# Patient Record
Sex: FEMALE | Race: OTHER | URBAN - METROPOLITAN AREA
[De-identification: names, ages, dates, MRNs, and addresses within clinical notes are randomized per-mention and may not be internally consistent; named-entity substitution may affect disease eponyms.]

---

## 2017-02-28 RX ORDER — LEVETIRACETAM 500 MG/1
2 TABLET ORAL
COMMUNITY
Start: 2015-07-06 | End: 2020-05-01

## 2017-02-28 RX ORDER — GUAIFENESIN 100 MG/5ML
10 SYRUP ORAL
COMMUNITY
Start: 2015-07-06 | End: 2018-02-07

## 2017-02-28 RX ORDER — NIACIN 500 MG
1 TABLET ORAL DAILY
COMMUNITY
Start: 2015-07-06

## 2017-02-28 RX ORDER — ASPIRIN 81 MG/1
1 TABLET, CHEWABLE ORAL DAILY
COMMUNITY
Start: 2015-07-06 | End: 2020-05-01

## 2017-02-28 RX ORDER — LISINOPRIL 5 MG/1
1 TABLET ORAL DAILY
COMMUNITY
Start: 2015-07-06 | End: 2020-05-01

## 2017-02-28 RX ORDER — OLANZAPINE 20 MG/1
1 TABLET ORAL DAILY
COMMUNITY
Start: 2015-07-06 | End: 2019-11-26

## 2017-02-28 RX ORDER — ALLOPURINOL 100 MG/1
1 TABLET ORAL DAILY
COMMUNITY
Start: 2015-07-06 | End: 2020-05-01

## 2017-02-28 RX ORDER — LANCETS 33 GAUGE
EACH MISCELLANEOUS
COMMUNITY
Start: 2016-07-19 | End: 2017-07-24

## 2017-02-28 RX ORDER — MINOCYCLINE HYDROCHLORIDE 50 MG/1
1 TABLET ORAL
COMMUNITY
Start: 2015-09-30 | End: 2017-08-14 | Stop reason: ALTCHOICE

## 2017-02-28 RX ORDER — BENZTROPINE MESYLATE 0.5 MG/1
1 TABLET ORAL 2 TIMES DAILY
COMMUNITY
Start: 2015-07-06

## 2017-02-28 RX ORDER — LORAZEPAM 0.5 MG/1
1 TABLET ORAL 2 TIMES DAILY
COMMUNITY
Start: 2015-07-06 | End: 2019-05-29

## 2017-02-28 RX ORDER — GLIMEPIRIDE 4 MG/1
1 TABLET ORAL EVERY MORNING
COMMUNITY
Start: 2015-07-06 | End: 2019-11-04

## 2017-02-28 RX ORDER — LITHIUM CARBONATE 300 MG
1 TABLET ORAL EVERY 12 HOURS
COMMUNITY
Start: 2015-07-06

## 2017-02-28 RX ORDER — FERROUS SULFATE 325(65) MG
1 TABLET ORAL EVERY 12 HOURS
COMMUNITY
Start: 2015-07-06 | End: 2020-05-01

## 2017-02-28 RX ORDER — HYDROCORTISONE 0.5 %
CREAM (GRAM) TOPICAL
COMMUNITY
Start: 2015-07-06 | End: 2018-11-07

## 2017-02-28 RX ORDER — INSULIN LISPRO 100 [IU]/ML
INJECTION, SOLUTION INTRAVENOUS; SUBCUTANEOUS
COMMUNITY
Start: 2015-07-06 | End: 2019-11-04

## 2017-02-28 RX ORDER — MAGNESIUM HYDROXIDE/ALUMINUM HYDROXICE/SIMETHICONE 120; 1200; 1200 MG/30ML; MG/30ML; MG/30ML
30 SUSPENSION ORAL
COMMUNITY
Start: 2015-07-06 | End: 2020-05-01

## 2017-02-28 RX ORDER — LISINOPRIL 5 MG/1
TABLET ORAL
Qty: 31 TABLET | Refills: 10 | Status: SHIPPED | OUTPATIENT
Start: 2017-02-28 | End: 2017-08-14

## 2017-02-28 NOTE — TELEPHONE ENCOUNTER
Marco Antonio Mcarthur at [employer] informed of the below recommendations.   Appt scheduled with Dr. Mian Rodas on Friday, 3/10/2017 @ 1:30pm. Caty Chapin, 02/28/2017, 3:58 PM

## 2017-02-28 NOTE — TELEPHONE ENCOUNTER
Pt due for complete physical March 3, 2017. Will provide 1 month refill. Please instruct patient to make physical appointment. Thank you.

## 2017-03-10 ENCOUNTER — OFFICE VISIT (OUTPATIENT)
Dept: FAMILY MEDICINE CLINIC | Facility: CLINIC | Age: 32
End: 2017-03-10

## 2017-03-10 VITALS
WEIGHT: 193 LBS | DIASTOLIC BLOOD PRESSURE: 72 MMHG | SYSTOLIC BLOOD PRESSURE: 108 MMHG | HEART RATE: 104 BPM | TEMPERATURE: 98 F

## 2017-03-10 DIAGNOSIS — Z00.00 PHYSICAL EXAM: Primary | ICD-10-CM

## 2017-03-10 DIAGNOSIS — E11.9 CONTROLLED TYPE 2 DIABETES MELLITUS WITHOUT COMPLICATION, WITHOUT LONG-TERM CURRENT USE OF INSULIN (HCC): ICD-10-CM

## 2017-03-10 PROCEDURE — 99395 PREV VISIT EST AGE 18-39: CPT | Performed by: FAMILY MEDICINE

## 2017-03-10 NOTE — PATIENT INSTRUCTIONS
Continue current medications   Continue to monitor glucose level, call if glucose level less than 70 or more than 300. Check fasting labs.

## 2017-03-11 NOTE — PROGRESS NOTES
HPI:     Wei Gomez is a 34year old female who presents for an Annual Health Visit. Patient is here with her caregiver from group home. Patient also has history of diabetes which is stable and she is tolerating medications well.   She does not ha sliding scale three times a day  151-200 =2 units, 201-250 = 3 units, 251-300 = 4 units, 301-350=5 units, 351-400=6 units, 401 or greater call RN Disp:  Rfl:    Insulin Syringe-Needle U-100 (BD INSULIN SYRINGE) 30G X 1/2\" 0.5 ML Does not apply Misc  Disp: rashes  EYES: no visual complaints or deficits  HEENT: denies nasal congestion, sinus pain or sore throat; hearing loss negative  RESPIRATORY: denies shortness of breath, wheezing or cough   CARDIOVASCULAR: denies chest pain or HOUSE; no palpitations   GI: d CHRONIC CONDITIONS:     Meds & Refills for this Visit:  No prescriptions requested or ordered in this encounter       Imaging & Consults:  None    Visit Diagnoses:  Physical exam  (primary encounter diagnosis)  Controlled type 2 diabetes mellitus without c

## 2017-04-12 ENCOUNTER — TELEPHONE (OUTPATIENT)
Dept: FAMILY MEDICINE CLINIC | Facility: CLINIC | Age: 32
End: 2017-04-12

## 2017-04-12 NOTE — PROGRESS NOTES
Please inform nurse at a once a day to decrease patient's Lantus to 7 units daily.   Phone #[phone]  Asa To

## 2017-04-12 NOTE — PROGRESS NOTES
Please inform nurse at a once a day to decrease patient's Lantus to 7 units daily.   Phone #[phone]  Tosha Rendon

## 2017-04-12 NOTE — TELEPHONE ENCOUNTER
Left message to contact office. Please inform nurse at a once a day to decrease patient's Lantus to 7 units daily. Per Dr Hermelinda Oneill.

## 2017-07-13 ENCOUNTER — TELEPHONE (OUTPATIENT)
Dept: FAMILY MEDICINE CLINIC | Facility: CLINIC | Age: 32
End: 2017-07-13

## 2017-07-13 NOTE — TELEPHONE ENCOUNTER
Please call nurse at [facility], advised to decrease patient's Lantus to 5 units due to patient having low blood sugar level.

## 2017-07-24 RX ORDER — LANCETS 33 GAUGE
EACH MISCELLANEOUS
Qty: 200 EACH | Refills: 0 | Status: SHIPPED | OUTPATIENT
Start: 2017-07-24 | End: 2017-10-11

## 2017-07-26 ENCOUNTER — TELEPHONE (OUTPATIENT)
Dept: FAMILY MEDICINE CLINIC | Facility: CLINIC | Age: 32
End: 2017-07-26

## 2017-07-26 LAB
HDL CHOLESTEROL: 31 MG/DL
LDL CHOLESTEROL: 62 MG/DL (ref ?–130)
LITHIUM: 0.6
TOTAL CHOLESTEROL: 118 MG/DL (ref ?–200)
TRIGLYCERIDES: 126 MG/DL
TSH: 3.72 UIU/ML

## 2017-08-14 ENCOUNTER — OFFICE VISIT (OUTPATIENT)
Dept: FAMILY MEDICINE CLINIC | Facility: CLINIC | Age: 32
End: 2017-08-14

## 2017-08-14 ENCOUNTER — APPOINTMENT (OUTPATIENT)
Dept: LAB | Age: 32
End: 2017-08-14
Attending: FAMILY MEDICINE
Payer: MEDICAID

## 2017-08-14 VITALS
SYSTOLIC BLOOD PRESSURE: 126 MMHG | WEIGHT: 207.25 LBS | HEART RATE: 88 BPM | TEMPERATURE: 99 F | RESPIRATION RATE: 16 BRPM | DIASTOLIC BLOOD PRESSURE: 72 MMHG

## 2017-08-14 DIAGNOSIS — N61.1 ABSCESS OF LEFT BREAST: ICD-10-CM

## 2017-08-14 DIAGNOSIS — I10 ESSENTIAL HYPERTENSION: ICD-10-CM

## 2017-08-14 DIAGNOSIS — F25.9 CHRONIC SCHIZOAFFECTIVE DISORDER (HCC): ICD-10-CM

## 2017-08-14 DIAGNOSIS — E11.9 CONTROLLED TYPE 2 DIABETES MELLITUS WITHOUT COMPLICATION, WITHOUT LONG-TERM CURRENT USE OF INSULIN (HCC): Primary | ICD-10-CM

## 2017-08-14 LAB
ALBUMIN SERPL-MCNC: 4.1 G/DL (ref 3.5–4.8)
ALP LIVER SERPL-CCNC: 54 U/L (ref 37–98)
ALT SERPL-CCNC: 18 U/L (ref 14–54)
AST SERPL-CCNC: 12 U/L (ref 15–41)
BASOPHILS # BLD AUTO: 0.03 X10(3) UL (ref 0–0.1)
BASOPHILS NFR BLD AUTO: 0.3 %
BILIRUB SERPL-MCNC: 0.1 MG/DL (ref 0.1–2)
BUN BLD-MCNC: 19 MG/DL (ref 8–20)
CALCIUM BLD-MCNC: 10 MG/DL (ref 8.3–10.3)
CHLORIDE: 105 MMOL/L (ref 101–111)
CO2: 24 MMOL/L (ref 22–32)
CREAT BLD-MCNC: 0.75 MG/DL (ref 0.55–1.02)
EOSINOPHIL # BLD AUTO: 0.12 X10(3) UL (ref 0–0.3)
EOSINOPHIL NFR BLD AUTO: 1 %
ERYTHROCYTE [DISTWIDTH] IN BLOOD BY AUTOMATED COUNT: 12.5 % (ref 11.5–16)
EST. AVERAGE GLUCOSE BLD GHB EST-MCNC: 97 MG/DL (ref 68–126)
GLUCOSE BLD-MCNC: 76 MG/DL (ref 70–99)
HBA1C MFR BLD HPLC: 5 % (ref ?–5.7)
HCT VFR BLD AUTO: 36.4 % (ref 34–50)
HGB BLD-MCNC: 11.6 G/DL (ref 12–16)
IMMATURE GRANULOCYTE COUNT: 0.06 X10(3) UL (ref 0–1)
IMMATURE GRANULOCYTE RATIO %: 0.5 %
LYMPHOCYTES # BLD AUTO: 3.94 X10(3) UL (ref 0.9–4)
LYMPHOCYTES NFR BLD AUTO: 33.6 %
M PROTEIN MFR SERPL ELPH: 8.8 G/DL (ref 6.1–8.3)
MCH RBC QN AUTO: 29.9 PG (ref 27–33.2)
MCHC RBC AUTO-ENTMCNC: 31.9 G/DL (ref 31–37)
MCV RBC AUTO: 93.8 FL (ref 81–100)
MONOCYTES # BLD AUTO: 0.46 X10(3) UL (ref 0.1–0.6)
MONOCYTES NFR BLD AUTO: 3.9 %
NEUTROPHIL ABS PRELIM: 7.12 X10 (3) UL (ref 1.3–6.7)
NEUTROPHILS # BLD AUTO: 7.12 X10(3) UL (ref 1.3–6.7)
NEUTROPHILS NFR BLD AUTO: 60.7 %
PLATELET # BLD AUTO: 356 10(3)UL (ref 150–450)
POTASSIUM SERPL-SCNC: 4.4 MMOL/L (ref 3.6–5.1)
RBC # BLD AUTO: 3.88 X10(6)UL (ref 3.8–5.1)
RED CELL DISTRIBUTION WIDTH-SD: 42.9 FL (ref 35.1–46.3)
SODIUM SERPL-SCNC: 137 MMOL/L (ref 136–144)
WBC # BLD AUTO: 11.7 X10(3) UL (ref 4–13)

## 2017-08-14 PROCEDURE — 99214 OFFICE O/P EST MOD 30 MIN: CPT | Performed by: FAMILY MEDICINE

## 2017-08-14 PROCEDURE — 80053 COMPREHEN METABOLIC PANEL: CPT | Performed by: FAMILY MEDICINE

## 2017-08-14 PROCEDURE — 83036 HEMOGLOBIN GLYCOSYLATED A1C: CPT | Performed by: FAMILY MEDICINE

## 2017-08-14 PROCEDURE — 85025 COMPLETE CBC W/AUTO DIFF WBC: CPT | Performed by: FAMILY MEDICINE

## 2017-08-14 PROCEDURE — 36415 COLL VENOUS BLD VENIPUNCTURE: CPT | Performed by: FAMILY MEDICINE

## 2017-08-14 RX ORDER — CEPHALEXIN 500 MG/1
500 CAPSULE ORAL 3 TIMES DAILY
Qty: 30 CAPSULE | Refills: 0 | Status: SHIPPED | COMMUNITY
Start: 2017-08-14 | End: 2018-02-07

## 2017-08-14 RX ORDER — VALPROIC ACID 250 MG/5ML
5 SOLUTION ORAL EVERY 8 HOURS PRN
Refills: 0 | COMMUNITY
Start: 2017-05-28 | End: 2019-05-29

## 2017-08-14 NOTE — PROGRESS NOTES
[address]  PROGRESS NOTE  Chief Complaint:   Patient presents with:  Diabetes: Follow up for diabetes      HPI:   This is a 34year old female with history of diabetes and hypertension presents with caretaker for follow-up.   Patient has mouth 3 (three) times daily.  Disp: 30 capsule Rfl: 0   ONETOUCH DELICA LANCETS 78E Does not apply Misc TEST BLOOD SUGAR THREE TIMES DAILY AND AS NEEDED UP TO 5 TIMES PER DAY Disp: 200 each Rfl: 0   ONETOUCH ULTRA BLUE In Vitro Strip TEST BLOOD SUGAR THREE magnesium hydroxide (EQ MILK OF MAGNESIA) 400 MG/5ML Oral Suspension 2 teaspoons as needed every 12 hours Disp:  Rfl:    menthol-methyl salicylate 71-94 % External Cream Apply to left leg every 6 hours as needed for pain Disp:  Rfl:    metRONIDAZOLE 0.75 anicteric, conjunctiva normal, PERRLA, EOMI. LUNGS: Clear to auscultation bilterally, no rales/rhonchi/wheezing. HEART:  Regular rate and rhythm, S1 and S2 are normal, no murmurs, rubs or gallops.   EXTREMITIES: No edema, no cyanosis, no clubbing, FROM, 2 02/24/1984  Diabetes Care Foot Exam due on 02/24/1984  Diabetes Care A1C due on 02/24/1984  Diabetes Care Dilated Eye Exam due on 02/24/1984  Pap Smear,3 Years due on 02/24/2015    Patient/Caregiver Education: Patient/Caregiver Education: There are no barr

## 2017-08-14 NOTE — PATIENT INSTRUCTIONS
Continue current medications   Start Keflex for abscess. See Dr Krystal Pal, surgeon for possible drainage. Continue to monitor glucose level, call if glucose level less than 70 or more than 300. Check labs today.

## 2017-08-16 ENCOUNTER — TELEPHONE (OUTPATIENT)
Dept: FAMILY MEDICINE CLINIC | Facility: CLINIC | Age: 32
End: 2017-08-16

## 2017-08-16 NOTE — TELEPHONE ENCOUNTER
Please inform nurse at [address] [place] [address] that, patient's HgA1c is 5.0. Her diabetes is over controlled recommend to decrease her lantus from 7 units to 5 units. Her other labs are ok.

## 2017-10-11 RX ORDER — LANCETS 33 GAUGE
EACH MISCELLANEOUS
Qty: 200 EACH | Refills: 11 | Status: SHIPPED | OUTPATIENT
Start: 2017-10-11

## 2017-10-11 NOTE — TELEPHONE ENCOUNTER
Future appt:  None   Last Appointment:  8/14/2017; Return in about 3 months (around 11/14/2017).      TOTAL CHOLESTEROL (mg/dL)   Date Value   07/11/2017 118   ----------  HDL Cholesterol (mg/dL)   Date Value   07/11/2017 31   ----------  LDL CHOLESTEROL (m

## 2017-10-27 ENCOUNTER — TELEPHONE (OUTPATIENT)
Dept: FAMILY MEDICINE CLINIC | Facility: CLINIC | Age: 32
End: 2017-10-27

## 2017-10-27 NOTE — TELEPHONE ENCOUNTER
Received fax stating that patient has boil under her arm again. States that they seem to resolve when using warm compresses.   Patient lives at [address] [place] [address] and the staff is wondering if there may be a prescription deodorant that might help this conditio

## 2018-02-07 ENCOUNTER — OFFICE VISIT (OUTPATIENT)
Dept: FAMILY MEDICINE CLINIC | Facility: CLINIC | Age: 33
End: 2018-02-07

## 2018-02-07 VITALS
DIASTOLIC BLOOD PRESSURE: 62 MMHG | WEIGHT: 221.38 LBS | RESPIRATION RATE: 18 BRPM | TEMPERATURE: 98 F | HEART RATE: 98 BPM | OXYGEN SATURATION: 97 % | SYSTOLIC BLOOD PRESSURE: 116 MMHG

## 2018-02-07 DIAGNOSIS — J40 BRONCHITIS: Primary | ICD-10-CM

## 2018-02-07 DIAGNOSIS — R19.7 DIARRHEA, UNSPECIFIED TYPE: ICD-10-CM

## 2018-02-07 PROCEDURE — 99214 OFFICE O/P EST MOD 30 MIN: CPT | Performed by: FAMILY MEDICINE

## 2018-02-07 RX ORDER — GUAIFENESIN 100 MG/5ML
10 SYRUP ORAL EVERY 4 HOURS PRN
Qty: 1 BOTTLE | Refills: 5 | Status: SHIPPED | OUTPATIENT
Start: 2018-02-07 | End: 2020-05-01

## 2018-02-07 RX ORDER — AZITHROMYCIN 250 MG/1
TABLET, FILM COATED ORAL
Qty: 6 TABLET | Refills: 0 | Status: SHIPPED | OUTPATIENT
Start: 2018-02-07 | End: 2018-11-07 | Stop reason: ALTCHOICE

## 2018-02-07 NOTE — PROGRESS NOTES
[facility]  [facility]  Chief Complaint:   Patient presents with:  Fever: fever and productive cough since sunday       HPI:   This is a 34year old female presents with caregiver complaining of patient having fever, productive cough Monocyte % 3.9 %   Eosinophil % 1.0 %   Basophil % 0.3 %   Immature Granulocyte % 0.5 %       Past Medical History:   Diagnosis Date   • Acne    • Anemia    • Bipolar affective disorder ([facility]    • Diabetes type 2, controlled ([facility]    • Gout    • Hearing 314-165-62 MG/5ML Oral Suspension Take 30 mL by mouth. Every 12 hours as needed for indigestion Disp:  Rfl:    aspirin 81 MG Oral Chew Tab Chew 1 tablet by mouth daily.  Disp:  Rfl:    Benztropine Mesylate 0.5 MG Oral Tab Take 1 tablet by mouth 2 (two) time exertion or at rest.  RESPIRATORY: See HPI  GASTROINTESTINAL:  Denies abdominal pain, nausea, vomiting, constipation, diarrhea, or blood in stool. MUSCULOSKELETAL:  Denies weakness, muscle aches, back pain, joint pain, swelling or stiffness.   LYMPHATICS: by mouth today, then one daily. Patient Instructions   Recommend rest, plenty of fluids. Also recommend half low carb gatorade with water or pedialyte diluted in water for next 2-3 days. Start antibiotics.    Use robitussin as needed   Go to ER if

## 2018-02-07 NOTE — PATIENT INSTRUCTIONS
Recommend rest, plenty of fluids. Also recommend half low carb gatorade with water or pedialyte diluted in water for next 2-3 days. Start antibiotics. Use robitussin as needed   Go to ER if any sign of dehydration or if patient not feeling well.    Retu

## 2018-02-08 ENCOUNTER — TELEPHONE (OUTPATIENT)
Dept: FAMILY MEDICINE CLINIC | Facility: CLINIC | Age: 33
End: 2018-02-08

## 2018-02-08 RX ORDER — ACETAMINOPHEN 325 MG/1
TABLET ORAL
Qty: 30 TABLET | Refills: 1 | Status: SHIPPED | OUTPATIENT
Start: 2018-02-08 | End: 2020-05-01

## 2018-02-08 NOTE — TELEPHONE ENCOUNTER
The nurse was informed of the following below and agreed. Nurse is needing a script sent over with specific dosage and how often she can take medication.      Needs it faxed to [facility] Della Louis

## 2018-02-08 NOTE — TELEPHONE ENCOUNTER
Yes they may alternate Tylenol and ibuprofen every 4 hours. Dose of Tylenol, then 4 hours later ibuprofen, then 4 hours later Tylenol, etc. (need to have at least 6 hrs between ibuprofen doses).

## 2018-02-08 NOTE — TELEPHONE ENCOUNTER
seen yesterday and diagnosed with bronchitis- is still running fever with ibuprofen - can they give her tylenol in between doses?

## 2018-03-14 ENCOUNTER — TELEPHONE (OUTPATIENT)
Dept: FAMILY MEDICINE CLINIC | Facility: CLINIC | Age: 33
End: 2018-03-14

## 2018-03-14 NOTE — TELEPHONE ENCOUNTER
Need signed referral for OT Adult Sensory Eval dated 11/10/17 with dx of Adult Sensory disorder.    fax # [phone]

## 2018-05-17 ENCOUNTER — OFFICE VISIT (OUTPATIENT)
Dept: FAMILY MEDICINE CLINIC | Facility: CLINIC | Age: 33
End: 2018-05-17

## 2018-05-17 VITALS
TEMPERATURE: 99 F | SYSTOLIC BLOOD PRESSURE: 118 MMHG | WEIGHT: 221 LBS | HEART RATE: 92 BPM | DIASTOLIC BLOOD PRESSURE: 78 MMHG | RESPIRATION RATE: 18 BRPM

## 2018-05-17 DIAGNOSIS — E11.9 CONTROLLED TYPE 2 DIABETES MELLITUS WITHOUT COMPLICATION, WITHOUT LONG-TERM CURRENT USE OF INSULIN (HCC): ICD-10-CM

## 2018-05-17 DIAGNOSIS — Z00.00 PHYSICAL EXAM: Primary | ICD-10-CM

## 2018-05-17 DIAGNOSIS — I10 ESSENTIAL HYPERTENSION: ICD-10-CM

## 2018-05-17 PROCEDURE — 99395 PREV VISIT EST AGE 18-39: CPT | Performed by: FAMILY MEDICINE

## 2018-05-17 NOTE — PROGRESS NOTES
[facility]    HPI:     Mesha Wolf is a 28year old female who presents for an Annual Health Visit. Patient presents with staff from [address] [place] [address].   Patient has history of diabetes, her lungs reviewed, there has been couple occasions fo Suspension Take 30 mL by mouth. Every 12 hours as needed for indigestion Disp:  Rfl:    aspirin 81 MG Oral Chew Tab Chew 1 tablet by mouth daily. Disp:  Rfl:    Benztropine Mesylate 0.5 MG Oral Tab Take 1 tablet by mouth 2 (two) times daily.  Disp:  Rfl: disorder    • Lead poisoning    • Metabolic syndrome    • Moderate mental retardation    • Schizoaffective disorder, chronic condition ([facility]    • Seizure disorder (HCC)       No past surgical history on file. No family history on file.    SOCIAL HISTORY: DIFFERENTIAL WITH PLATELET  -     COMP METABOLIC PANEL (14)  -     HEMOGLOBIN A1C  -     TSH W REFLEX TO FREE T4    Controlled type 2 diabetes mellitus without complication, without long-term current use of insulin (HCC)  -     HEMOGLOBIN A1C    Essential

## 2018-05-17 NOTE — PATIENT INSTRUCTIONS
Recommend to decrease Lantus to 3 units. Continue to monitor glucose level. Check labs. Return to clinic if any concern.

## 2018-07-18 ENCOUNTER — TELEPHONE (OUTPATIENT)
Dept: FAMILY MEDICINE CLINIC | Facility: CLINIC | Age: 33
End: 2018-07-18

## 2018-07-18 NOTE — TELEPHONE ENCOUNTER
Insurance changes coverage on test strips to contour. Dr Mian Rodas can you please send in new rx. Thank you.        Future Appointments  Date Time Provider Renetta Zabrina   11/7/2018 10:00 AM Marily Tuttle MD [facility] [place] [facility] [place]      Return in about

## 2018-08-27 ENCOUNTER — TELEPHONE (OUTPATIENT)
Dept: FAMILY MEDICINE CLINIC | Facility: CLINIC | Age: 33
End: 2018-08-27

## 2018-09-10 ENCOUNTER — MED REC SCAN ONLY (OUTPATIENT)
Dept: FAMILY MEDICINE CLINIC | Facility: CLINIC | Age: 33
End: 2018-09-10

## 2018-09-25 ENCOUNTER — TELEPHONE (OUTPATIENT)
Dept: FAMILY MEDICINE CLINIC | Facility: CLINIC | Age: 33
End: 2018-09-25

## 2018-10-13 ENCOUNTER — MED REC SCAN ONLY (OUTPATIENT)
Dept: FAMILY MEDICINE CLINIC | Facility: CLINIC | Age: 33
End: 2018-10-13

## 2018-11-02 ENCOUNTER — TELEPHONE (OUTPATIENT)
Dept: FAMILY MEDICINE CLINIC | Facility: CLINIC | Age: 33
End: 2018-11-02

## 2018-11-02 DIAGNOSIS — E11.9 CONTROLLED TYPE 2 DIABETES MELLITUS WITHOUT COMPLICATION, WITHOUT LONG-TERM CURRENT USE OF INSULIN (HCC): Primary | ICD-10-CM

## 2018-11-02 NOTE — TELEPHONE ENCOUNTER
Please inform nurse at [address] [place] [address] that patient is due for her labs. Patient may schedule lab appointment before her next appointment next Wednesday at 8. Orders are placed in chart.

## 2018-11-07 ENCOUNTER — OFFICE VISIT (OUTPATIENT)
Dept: FAMILY MEDICINE CLINIC | Facility: CLINIC | Age: 33
End: 2018-11-07
Payer: MEDICAID

## 2018-11-07 VITALS
HEART RATE: 76 BPM | RESPIRATION RATE: 20 BRPM | WEIGHT: 238.38 LBS | TEMPERATURE: 97 F | DIASTOLIC BLOOD PRESSURE: 84 MMHG | SYSTOLIC BLOOD PRESSURE: 118 MMHG

## 2018-11-07 DIAGNOSIS — I10 ESSENTIAL HYPERTENSION: ICD-10-CM

## 2018-11-07 DIAGNOSIS — E11.9 CONTROLLED TYPE 2 DIABETES MELLITUS WITHOUT COMPLICATION, WITHOUT LONG-TERM CURRENT USE OF INSULIN (HCC): Primary | ICD-10-CM

## 2018-11-07 DIAGNOSIS — D64.9 ANEMIA, UNSPECIFIED TYPE: ICD-10-CM

## 2018-11-07 PROCEDURE — 99214 OFFICE O/P EST MOD 30 MIN: CPT | Performed by: FAMILY MEDICINE

## 2018-11-07 PROCEDURE — 84550 ASSAY OF BLOOD/URIC ACID: CPT | Performed by: FAMILY MEDICINE

## 2018-11-07 PROCEDURE — 36415 COLL VENOUS BLD VENIPUNCTURE: CPT | Performed by: FAMILY MEDICINE

## 2018-11-07 PROCEDURE — 80050 GENERAL HEALTH PANEL: CPT | Performed by: FAMILY MEDICINE

## 2018-11-07 PROCEDURE — 83036 HEMOGLOBIN GLYCOSYLATED A1C: CPT | Performed by: FAMILY MEDICINE

## 2018-11-07 RX ORDER — OLANZAPINE 5 MG/1
5 TABLET ORAL NIGHTLY
COMMUNITY

## 2018-11-07 RX ORDER — OLANZAPINE 15 MG/1
15 TABLET ORAL DAILY
COMMUNITY
End: 2021-01-19 | Stop reason: DRUGHIGH

## 2018-11-07 NOTE — PATIENT INSTRUCTIONS
Continue current medications and current care. Check labs today. Recommend not to give diabetic medication when patient is fasting for labs in morning. She can have it after she eats breakfast after lab draw.

## 2018-11-07 NOTE — PROGRESS NOTES
[address]  PROGRESS NOTE  Chief Complaint:   Patient presents with: Follow - Up      HPI:   This is a 28year old female presents with staff from [address] [place] [address] for follow-up on diabetes, hypertension.   Patient's blood glucose at home has bee not apply Misc TEST BLOOD SUGAR THREE TIMES DAILY AND AS NEEDED UP TO 5 TIMES PER DAY Disp: 200 each Rfl: 11   Valproate Sodium 250 MG/5ML Oral Solution Take 5 mL by mouth every 8 (eight) hours as needed.  Disp:  Rfl: 0   Insulin Syringe-Needle U-100 (BD IN given: Not Answered         REVIEW OF SYSTEMS:   See HPI, per staff    EXAM:   /84 (BP Location: Right arm, Patient Position: Sitting, Cuff Size: large)   Pulse 76   Temp 97.1 °F (36.2 °C) (Tympanic)   Resp 20   Wt 238 lb 6 oz  There is no height or on 07/11/2018    Patient/Caregiver Education: Patient/Caregiver Education: There are no barriers to learning. Medical education done. Outcome: Patient verbalizes understanding.  Patient is notified to call with any questions, complications, allergies, or

## 2018-11-08 ENCOUNTER — TELEPHONE (OUTPATIENT)
Dept: FAMILY MEDICINE CLINIC | Facility: CLINIC | Age: 33
End: 2018-11-08

## 2018-11-08 NOTE — TELEPHONE ENCOUNTER
----- Message from Fozia Jordan MD sent at 11/8/2018  8:19 AM CST -----  Please inform nurse that patient's hemoglobin A1c is 5.3, her diabetes is stable, recommend to continue with current medication. Her hemoglobin is also slightly low at 11.3.   Rest o

## 2018-12-21 ENCOUNTER — TELEPHONE (OUTPATIENT)
Dept: FAMILY MEDICINE CLINIC | Facility: CLINIC | Age: 33
End: 2018-12-21

## 2018-12-21 NOTE — TELEPHONE ENCOUNTER
Linda Perkins is the patient's care coordinator. She was just calling to ask if there were any questions for her? Informed Kimi that I did not. No other questions at this time.

## 2019-03-12 ENCOUNTER — TELEPHONE (OUTPATIENT)
Dept: FAMILY MEDICINE CLINIC | Facility: CLINIC | Age: 34
End: 2019-03-12

## 2019-03-12 NOTE — TELEPHONE ENCOUNTER
Magnus Calderón is patients care coordinator, wants to let Dr know that he can give her a call if there's any concerns in regards to patient.

## 2019-05-29 ENCOUNTER — OFFICE VISIT (OUTPATIENT)
Dept: FAMILY MEDICINE CLINIC | Facility: CLINIC | Age: 34
End: 2019-05-29
Payer: MEDICAID

## 2019-05-29 VITALS
SYSTOLIC BLOOD PRESSURE: 110 MMHG | HEIGHT: 63 IN | RESPIRATION RATE: 22 BRPM | TEMPERATURE: 98 F | DIASTOLIC BLOOD PRESSURE: 70 MMHG | OXYGEN SATURATION: 99 % | HEART RATE: 110 BPM | BODY MASS INDEX: 42.35 KG/M2 | WEIGHT: 239 LBS

## 2019-05-29 DIAGNOSIS — I10 ESSENTIAL HYPERTENSION: ICD-10-CM

## 2019-05-29 DIAGNOSIS — Z00.00 PHYSICAL EXAM: Primary | ICD-10-CM

## 2019-05-29 DIAGNOSIS — D64.9 ANEMIA, UNSPECIFIED TYPE: ICD-10-CM

## 2019-05-29 DIAGNOSIS — E11.9 CONTROLLED TYPE 2 DIABETES MELLITUS WITHOUT COMPLICATION, WITHOUT LONG-TERM CURRENT USE OF INSULIN (HCC): ICD-10-CM

## 2019-05-29 PROCEDURE — 99395 PREV VISIT EST AGE 18-39: CPT | Performed by: FAMILY MEDICINE

## 2019-05-29 RX ORDER — VALPROIC ACID 250 MG/5ML
SOLUTION ORAL
Refills: 0 | COMMUNITY
Start: 2019-05-02 | End: 2020-05-01

## 2019-05-29 NOTE — PROGRESS NOTES
[address]    Chief Complaint:   Patient presents with:  Physical      HPI:     Li Marshall is a 27year old female who presents for an Annual Health Visit. Patient is here with staff from [address] [place] [address].   Staff member does not have any glimepiride (AMARYL) 4 MG Oral Tab Take 1 tablet by mouth every morning.  Daily with breakfast Disp:  Rfl:    ibuprofen 100 MG/5ML Oral Suspension 1 teaspoon every 6 hours Disp:  Rfl:    Insulin Lispro (HUMALOG) 100 UNIT/ML Subcutaneous Solution Inject pe denies any unusual skin lesions or rashes  EYES: no visual complaints or deficits  HEENT: denies nasal congestion, sinus pain or sore throat; hearing loss negative  RESPIRATORY: denies shortness of breath, wheezing or cough   CARDIOVASCULAR: denies chest p lower extremity  EXTREMITIES: no cyanosis, clubbing or edema, peripheral pulses intact  PSYCHIATRIC: alert and oriented x 3; affect appropriate      ASSESSMENT AND PLAN:   Matilde Francisco was seen today for physical.    Diagnoses and all orders for this visit:    P

## 2019-05-29 NOTE — PATIENT INSTRUCTIONS
Continue current medications  And current care  Return to clinic for fasting labs. Do not take morning diabetes medication on day of fasting labs.    May take medication after lab draw and eating breakfast.

## 2019-07-08 ENCOUNTER — LABORATORY ENCOUNTER (OUTPATIENT)
Dept: LAB | Age: 34
End: 2019-07-08
Attending: FAMILY MEDICINE
Payer: MEDICAID

## 2019-07-08 DIAGNOSIS — Z79.899 ENCOUNTER FOR LONG-TERM (CURRENT) USE OF OTHER MEDICATIONS: ICD-10-CM

## 2019-07-08 DIAGNOSIS — Z00.00 PHYSICAL EXAM: ICD-10-CM

## 2019-07-08 DIAGNOSIS — E11.9 CONTROLLED TYPE 2 DIABETES MELLITUS WITHOUT COMPLICATION, WITHOUT LONG-TERM CURRENT USE OF INSULIN (HCC): ICD-10-CM

## 2019-07-08 LAB
ALBUMIN SERPL-MCNC: 3.8 G/DL (ref 3.4–5)
ALBUMIN/GLOB SERPL: 0.9 {RATIO} (ref 1–2)
ALP LIVER SERPL-CCNC: 48 U/L (ref 37–98)
ALT SERPL-CCNC: 25 U/L (ref 13–56)
ANION GAP SERPL CALC-SCNC: 6 MMOL/L (ref 0–18)
AST SERPL-CCNC: 9 U/L (ref 15–37)
BASOPHILS # BLD AUTO: 0.03 X10(3) UL (ref 0–0.2)
BASOPHILS NFR BLD AUTO: 0.3 %
BILIRUB SERPL-MCNC: 0.3 MG/DL (ref 0.1–2)
BUN BLD-MCNC: 16 MG/DL (ref 7–18)
BUN/CREAT SERPL: 17.2 (ref 10–20)
CALCIUM BLD-MCNC: 10 MG/DL (ref 8.5–10.1)
CHLORIDE SERPL-SCNC: 104 MMOL/L (ref 98–112)
CHOLEST SMN-MCNC: 140 MG/DL (ref ?–200)
CO2 SERPL-SCNC: 26 MMOL/L (ref 21–32)
CREAT BLD-MCNC: 0.93 MG/DL (ref 0.55–1.02)
DEPRECATED RDW RBC AUTO: 49.3 FL (ref 35.1–46.3)
EOSINOPHIL # BLD AUTO: 0.27 X10(3) UL (ref 0–0.7)
EOSINOPHIL NFR BLD AUTO: 2.7 %
ERYTHROCYTE [DISTWIDTH] IN BLOOD BY AUTOMATED COUNT: 13.7 % (ref 11–15)
EST. AVERAGE GLUCOSE BLD GHB EST-MCNC: 134 MG/DL (ref 68–126)
GLOBULIN PLAS-MCNC: 4.4 G/DL (ref 2.8–4.4)
GLUCOSE BLD-MCNC: 150 MG/DL (ref 70–99)
HBA1C MFR BLD HPLC: 6.3 % (ref ?–5.7)
HCT VFR BLD AUTO: 36.4 % (ref 35–48)
HDLC SERPL-MCNC: 28 MG/DL (ref 40–59)
HGB BLD-MCNC: 11.3 G/DL (ref 12–16)
IMM GRANULOCYTES # BLD AUTO: 0.09 X10(3) UL (ref 0–1)
IMM GRANULOCYTES NFR BLD: 0.9 %
LDLC SERPL CALC-MCNC: 55 MG/DL (ref ?–100)
LITHIUM SERPL-SCNC: 0.7 MMOL/L (ref 0.6–1.2)
LYMPHOCYTES # BLD AUTO: 3.61 X10(3) UL (ref 1–4)
LYMPHOCYTES NFR BLD AUTO: 36 %
M PROTEIN MFR SERPL ELPH: 8.2 G/DL (ref 6.4–8.2)
MCH RBC QN AUTO: 30.1 PG (ref 26–34)
MCHC RBC AUTO-ENTMCNC: 31 G/DL (ref 31–37)
MCV RBC AUTO: 96.8 FL (ref 80–100)
MONOCYTES # BLD AUTO: 0.61 X10(3) UL (ref 0.1–1)
MONOCYTES NFR BLD AUTO: 6.1 %
NEUTROPHILS # BLD AUTO: 5.43 X10 (3) UL (ref 1.5–7.7)
NEUTROPHILS # BLD AUTO: 5.43 X10(3) UL (ref 1.5–7.7)
NEUTROPHILS NFR BLD AUTO: 54 %
NONHDLC SERPL-MCNC: 112 MG/DL (ref ?–130)
OSMOLALITY SERPL CALC.SUM OF ELEC: 286 MOSM/KG (ref 275–295)
PLATELET # BLD AUTO: 300 10(3)UL (ref 150–450)
POTASSIUM SERPL-SCNC: 4.4 MMOL/L (ref 3.5–5.1)
RBC # BLD AUTO: 3.76 X10(6)UL (ref 3.8–5.3)
SODIUM SERPL-SCNC: 136 MMOL/L (ref 136–145)
TRIGL SERPL-MCNC: 287 MG/DL (ref 30–149)
TSI SER-ACNC: 3.28 MIU/ML (ref 0.36–3.74)
VLDLC SERPL CALC-MCNC: 57 MG/DL (ref 0–30)
WBC # BLD AUTO: 10 X10(3) UL (ref 4–11)

## 2019-07-08 PROCEDURE — 84443 ASSAY THYROID STIM HORMONE: CPT

## 2019-07-08 PROCEDURE — 85025 COMPLETE CBC W/AUTO DIFF WBC: CPT

## 2019-07-08 PROCEDURE — 80053 COMPREHEN METABOLIC PANEL: CPT

## 2019-07-08 PROCEDURE — 80061 LIPID PANEL: CPT

## 2019-07-08 PROCEDURE — 36415 COLL VENOUS BLD VENIPUNCTURE: CPT

## 2019-07-08 PROCEDURE — 80178 ASSAY OF LITHIUM: CPT

## 2019-07-08 PROCEDURE — 83036 HEMOGLOBIN GLYCOSYLATED A1C: CPT

## 2019-07-09 ENCOUNTER — TELEPHONE (OUTPATIENT)
Dept: FAMILY MEDICINE CLINIC | Facility: CLINIC | Age: 34
End: 2019-07-09

## 2019-07-09 NOTE — TELEPHONE ENCOUNTER
----- Message from Anna Ring MD sent at 7/9/2019  8:02 AM CDT -----  Please inform nurse at [address] [place] [address] that patient's hemoglobin A1c is 6.3, it was 5.3 last time. Recommend to continue with current medication. Her glucose level is 150.   Rest of her CMP

## 2019-11-01 ENCOUNTER — TELEPHONE (OUTPATIENT)
Dept: FAMILY MEDICINE CLINIC | Facility: CLINIC | Age: 34
End: 2019-11-01

## 2019-11-01 NOTE — TELEPHONE ENCOUNTER
Jovanna Yusuf is calling to set up appt for patient due to blood sugar related problems. Jovanna Yusuf states that she has been speaking through fax with Dr Antony Vernon about monitoring elevated bs daily.  Blood sugar was elevated today but Jessica Spears is unsure on what

## 2019-11-04 ENCOUNTER — OFFICE VISIT (OUTPATIENT)
Dept: FAMILY MEDICINE CLINIC | Facility: CLINIC | Age: 34
End: 2019-11-04
Payer: MEDICAID

## 2019-11-04 VITALS
TEMPERATURE: 98 F | BODY MASS INDEX: 41.82 KG/M2 | HEART RATE: 94 BPM | SYSTOLIC BLOOD PRESSURE: 100 MMHG | WEIGHT: 236 LBS | RESPIRATION RATE: 16 BRPM | HEIGHT: 63 IN | OXYGEN SATURATION: 94 % | DIASTOLIC BLOOD PRESSURE: 80 MMHG

## 2019-11-04 DIAGNOSIS — E11.65 CONTROLLED TYPE 2 DIABETES MELLITUS WITH HYPERGLYCEMIA, WITHOUT LONG-TERM CURRENT USE OF INSULIN (HCC): Primary | ICD-10-CM

## 2019-11-04 PROCEDURE — 99214 OFFICE O/P EST MOD 30 MIN: CPT | Performed by: FAMILY MEDICINE

## 2019-11-04 RX ORDER — GLUCOSAM/CHON-MSM1/C/MANG/BOSW 500-416.6
1 TABLET ORAL DAILY
Qty: 100 EACH | Refills: 11 | Status: SHIPPED | OUTPATIENT
Start: 2019-11-04 | End: 2020-11-03

## 2019-11-04 RX ORDER — BLOOD-GLUCOSE METER
1 EACH MISCELLANEOUS 2 TIMES DAILY
Qty: 1 DEVICE | Refills: 0 | Status: SHIPPED | OUTPATIENT
Start: 2019-11-04 | End: 2020-11-03

## 2019-11-04 RX ORDER — CALCIUM CITRATE/VITAMIN D3 200MG-6.25
TABLET ORAL
Qty: 100 STRIP | Refills: 1 | Status: SHIPPED | OUTPATIENT
Start: 2019-11-04 | End: 2021-01-19 | Stop reason: ALTCHOICE

## 2019-11-04 RX ORDER — GLIMEPIRIDE 4 MG/1
4 TABLET ORAL 2 TIMES DAILY
Qty: 60 TABLET | Refills: 11 | Status: SHIPPED | OUTPATIENT
Start: 2019-11-04 | End: 2020-05-01

## 2019-11-04 NOTE — PROGRESS NOTES
[facility] [place]  PROGRESS NOTE  Chief Complaint:   Patient presents with:  Diabetes      HPI:   This is a 27year old female with history of diabetes presents with staff from [address] [place] [address] for follow-up and evaluation of recent elevated blood sugar Check daily 100 strip 1   • Blood Glucose Monitoring Suppl (TRUE METRIX METER) Does not apply Device 1 Device by Other route 2 (two) times daily. 1 Device 0   • TRUEPLUS LANCETS 28G Does not apply Misc 1 lancet by Finger stick route daily.  100 each 11   • Oral Tab Take 1 tablet by mouth every 12 (twelve) hours.      • magnesium hydroxide (EQ MILK OF MAGNESIA) 400 MG/5ML Oral Suspension 2 teaspoons as needed every 12 hours     • metRONIDAZOLE 0.75 % External Cream Apply to face at bedtime     • OLANZapine 20 Blood Glucose Monitoring Suppl (TRUE METRIX METER) Does not apply Device; 1 Device by Other route 2 (two) times daily. -     TRUEPLUS LANCETS 28G Does not apply Misc; 1 lancet by Finger stick route daily.         Patient Instructions   Increase glimepirid

## 2019-11-04 NOTE — PATIENT INSTRUCTIONS
Increase glimepiride to twice a day   Start Metformin TWICE A DAY   Recommend low carb diet. Monitor glucose daily. Continue to monitor glucose level, call if glucose level less than 70 or more than 300. Recheck in 1 month.

## 2019-11-26 ENCOUNTER — OFFICE VISIT (OUTPATIENT)
Dept: FAMILY MEDICINE CLINIC | Facility: CLINIC | Age: 34
End: 2019-11-26
Payer: MEDICAID

## 2019-11-26 VITALS
WEIGHT: 232 LBS | SYSTOLIC BLOOD PRESSURE: 100 MMHG | RESPIRATION RATE: 18 BRPM | BODY MASS INDEX: 41.11 KG/M2 | HEIGHT: 63 IN | DIASTOLIC BLOOD PRESSURE: 70 MMHG | HEART RATE: 108 BPM | OXYGEN SATURATION: 99 %

## 2019-11-26 DIAGNOSIS — E11.65 CONTROLLED TYPE 2 DIABETES MELLITUS WITH HYPERGLYCEMIA, WITHOUT LONG-TERM CURRENT USE OF INSULIN (HCC): Primary | ICD-10-CM

## 2019-11-26 PROCEDURE — 99213 OFFICE O/P EST LOW 20 MIN: CPT | Performed by: FAMILY MEDICINE

## 2019-11-26 NOTE — PROGRESS NOTES
[facility] [place]  PROGRESS NOTE  Chief Complaint:   Patient presents with:  Diabetes  Follow - Up      HPI:   This is a 27year old female presents to clinic with staff from [address] [place] [address] for follow-up on diabetes.   Last office visit patient's medic tablet (500 mg total) by mouth 2 (two) times daily with meals.  60 tablet 11   • Glucose Blood (TRUE METRIX BLOOD GLUCOSE TEST) In Vitro Strip Check daily 100 strip 1   • Blood Glucose Monitoring Suppl (TRUE METRIX METER) Does not apply Device 1 Device by O 500 MG Oral Tab Take 2 tablets by mouth nightly. • lisinopril 5 MG Oral Tab Take 1 tablet by mouth daily. • Lithium Carbonate 300 MG Oral Tab Take 1 tablet by mouth every 12 (twelve) hours.      • magnesium hydroxide (EQ MILK OF MAGNESIA) 400 MG/5ML hyperglycemia, without long-term current use of insulin (HCC)        Patient Instructions   Continue current medications  Check glucose level 2 times a week and as needed   Advice low carb in diet, AVOID FOOD WITH HIGH GLYCEMIC INDEX, have smaller portion

## 2019-11-26 NOTE — PATIENT INSTRUCTIONS
Continue current medications  Check glucose level 2 times a week and as needed   Advice low carb in diet, AVOID FOOD WITH HIGH GLYCEMIC INDEX, have smaller portion meal, avoid bread, pasta and potatoes, no juice or soda, don't eat late at night, exercise,

## 2020-02-20 ENCOUNTER — APPOINTMENT (OUTPATIENT)
Dept: LAB | Age: 35
End: 2020-02-20
Attending: FAMILY MEDICINE
Payer: MEDICAID

## 2020-02-20 ENCOUNTER — OFFICE VISIT (OUTPATIENT)
Dept: FAMILY MEDICINE CLINIC | Facility: CLINIC | Age: 35
End: 2020-02-20
Payer: MEDICAID

## 2020-02-20 VITALS
TEMPERATURE: 98 F | SYSTOLIC BLOOD PRESSURE: 119 MMHG | WEIGHT: 228 LBS | DIASTOLIC BLOOD PRESSURE: 78 MMHG | RESPIRATION RATE: 18 BRPM | HEART RATE: 80 BPM | HEIGHT: 63 IN | BODY MASS INDEX: 40.4 KG/M2

## 2020-02-20 DIAGNOSIS — D64.9 ANEMIA, UNSPECIFIED TYPE: ICD-10-CM

## 2020-02-20 DIAGNOSIS — E11.65 CONTROLLED TYPE 2 DIABETES MELLITUS WITH HYPERGLYCEMIA, WITHOUT LONG-TERM CURRENT USE OF INSULIN (HCC): Primary | ICD-10-CM

## 2020-02-20 DIAGNOSIS — I10 ESSENTIAL HYPERTENSION: ICD-10-CM

## 2020-02-20 LAB
ALBUMIN SERPL-MCNC: 3.8 G/DL (ref 3.4–5)
ALBUMIN/GLOB SERPL: 0.8 {RATIO} (ref 1–2)
ALP LIVER SERPL-CCNC: 48 U/L (ref 37–98)
ALT SERPL-CCNC: 19 U/L (ref 13–56)
ANION GAP SERPL CALC-SCNC: 3 MMOL/L (ref 0–18)
AST SERPL-CCNC: 12 U/L (ref 15–37)
BASOPHILS # BLD AUTO: 0.03 X10(3) UL (ref 0–0.2)
BASOPHILS NFR BLD AUTO: 0.3 %
BILIRUB SERPL-MCNC: 0.2 MG/DL (ref 0.1–2)
BUN BLD-MCNC: 12 MG/DL (ref 7–18)
BUN/CREAT SERPL: 13.2 (ref 10–20)
CALCIUM BLD-MCNC: 9.2 MG/DL (ref 8.5–10.1)
CHLORIDE SERPL-SCNC: 103 MMOL/L (ref 98–112)
CO2 SERPL-SCNC: 25 MMOL/L (ref 21–32)
CREAT BLD-MCNC: 0.91 MG/DL (ref 0.55–1.02)
DEPRECATED HBV CORE AB SER IA-ACNC: 309.9 NG/ML (ref 12–160)
DEPRECATED RDW RBC AUTO: 48.5 FL (ref 35.1–46.3)
EOSINOPHIL # BLD AUTO: 0.15 X10(3) UL (ref 0–0.7)
EOSINOPHIL NFR BLD AUTO: 1.3 %
ERYTHROCYTE [DISTWIDTH] IN BLOOD BY AUTOMATED COUNT: 14.2 % (ref 11–15)
EST. AVERAGE GLUCOSE BLD GHB EST-MCNC: 105 MG/DL (ref 68–126)
GLOBULIN PLAS-MCNC: 4.6 G/DL (ref 2.8–4.4)
GLUCOSE BLD-MCNC: 97 MG/DL (ref 70–99)
HBA1C MFR BLD HPLC: 5.3 % (ref ?–5.7)
HCT VFR BLD AUTO: 34 % (ref 35–48)
HGB BLD-MCNC: 10.7 G/DL (ref 12–16)
IMM GRANULOCYTES # BLD AUTO: 0.06 X10(3) UL (ref 0–1)
IMM GRANULOCYTES NFR BLD: 0.5 %
IRON SATURATION: 17 % (ref 15–50)
IRON SERPL-MCNC: 58 UG/DL (ref 50–170)
LYMPHOCYTES # BLD AUTO: 4.64 X10(3) UL (ref 1–4)
LYMPHOCYTES NFR BLD AUTO: 40 %
M PROTEIN MFR SERPL ELPH: 8.4 G/DL (ref 6.4–8.2)
MCH RBC QN AUTO: 29.2 PG (ref 26–34)
MCHC RBC AUTO-ENTMCNC: 31.5 G/DL (ref 31–37)
MCV RBC AUTO: 92.6 FL (ref 80–100)
MONOCYTES # BLD AUTO: 0.48 X10(3) UL (ref 0.1–1)
MONOCYTES NFR BLD AUTO: 4.1 %
NEUTROPHILS # BLD AUTO: 6.25 X10 (3) UL (ref 1.5–7.7)
NEUTROPHILS # BLD AUTO: 6.25 X10(3) UL (ref 1.5–7.7)
NEUTROPHILS NFR BLD AUTO: 53.8 %
OSMOLALITY SERPL CALC.SUM OF ELEC: 272 MOSM/KG (ref 275–295)
PATIENT FASTING Y/N/NP: NO
PLATELET # BLD AUTO: 295 10(3)UL (ref 150–450)
POTASSIUM SERPL-SCNC: 4.2 MMOL/L (ref 3.5–5.1)
RBC # BLD AUTO: 3.67 X10(6)UL (ref 3.8–5.3)
SODIUM SERPL-SCNC: 131 MMOL/L (ref 136–145)
TOTAL IRON BINDING CAPACITY: 343 UG/DL (ref 240–450)
TRANSFERRIN SERPL-MCNC: 230 MG/DL (ref 200–360)
WBC # BLD AUTO: 11.6 X10(3) UL (ref 4–11)

## 2020-02-20 PROCEDURE — 80053 COMPREHEN METABOLIC PANEL: CPT | Performed by: FAMILY MEDICINE

## 2020-02-20 PROCEDURE — 36415 COLL VENOUS BLD VENIPUNCTURE: CPT | Performed by: FAMILY MEDICINE

## 2020-02-20 PROCEDURE — 82728 ASSAY OF FERRITIN: CPT | Performed by: FAMILY MEDICINE

## 2020-02-20 PROCEDURE — 99214 OFFICE O/P EST MOD 30 MIN: CPT | Performed by: FAMILY MEDICINE

## 2020-02-20 PROCEDURE — 83550 IRON BINDING TEST: CPT | Performed by: FAMILY MEDICINE

## 2020-02-20 PROCEDURE — 83540 ASSAY OF IRON: CPT | Performed by: FAMILY MEDICINE

## 2020-02-20 PROCEDURE — 83036 HEMOGLOBIN GLYCOSYLATED A1C: CPT | Performed by: FAMILY MEDICINE

## 2020-02-20 PROCEDURE — 85025 COMPLETE CBC W/AUTO DIFF WBC: CPT | Performed by: FAMILY MEDICINE

## 2020-02-20 NOTE — PATIENT INSTRUCTIONS
Continue current medications  Check labs today. Advice low carb in diet, AVOID FOOD WITH HIGH GLYCEMIC INDEX, have smaller portion meal, avoid bread, pasta and potatoes, no juice or soda, don't eat late at night, exercise,  and weight loss.    Continue to

## 2020-02-20 NOTE — PROGRESS NOTES
[facility]  [facility]  Chief Complaint:   Patient presents with:  Medication Follow-Up    Patient is with staff from [address] [place] [address]  HPI:   This is a 27year old female with history of diabetes type 2, hypertension, anemia presents for foll METRIX BLOOD GLUCOSE TEST) In Vitro Strip Check daily 100 strip 1   • Blood Glucose Monitoring Suppl (TRUE METRIX METER) Does not apply Device 1 Device by Other route 2 (two) times daily.  1 Device 0   • TRUEPLUS LANCETS 28G Does not apply Misc 1 lancet by mouth daily. • Lithium Carbonate 300 MG Oral Tab Take 1 tablet by mouth every 12 (twelve) hours.      • magnesium hydroxide (EQ MILK OF MAGNESIA) 400 MG/5ML Oral Suspension 2 teaspoons as needed every 12 hours     • metRONIDAZOLE 0.75 % External Cream A S1 and S2 are normal, no murmurs, rubs or gallops. EXTREMITIES: No edema, no cyanosis, no clubbing, FROM, 2+ dorsalis pedis pulses bilaterally.   ABDOMEN: Soft, nondistended, nontender, bowel sounds normal in all 4 quadrants, no Masses, no hepatosplenomega call with any side effects or complications from the treatments as a result of today.      Problem List:  Patient Active Problem List:     Acne     Anemia     Bipolar affective disorder ([address]     Diabetes type 2, controlled ([address]     Gout     Hearing loss

## 2020-02-21 ENCOUNTER — TELEPHONE (OUTPATIENT)
Dept: FAMILY MEDICINE CLINIC | Facility: CLINIC | Age: 35
End: 2020-02-21

## 2020-02-21 DIAGNOSIS — D64.9 ANEMIA, UNSPECIFIED TYPE: Primary | ICD-10-CM

## 2020-02-21 NOTE — TELEPHONE ENCOUNTER
Please inform nurse at [address] [place], [place] [address] that patient's hemoglobin A1c is in normal range at 5.3. Patient sodium is low at 131, recommend to increase salt slightly in her diet. Rest of her CMP is okay. Her iron level is okay, ferritin is elevated at 309.   Patient

## 2020-03-10 RX ORDER — DIAZEPAM 5 MG/1
TABLET ORAL
Qty: 1 TABLET | Refills: 1 | Status: SHIPPED | OUTPATIENT
Start: 2020-03-10

## 2020-05-01 RX ORDER — ALUMINUM HYDROXIDE, MAGNESIUM HYDROXIDE, DIMETHICONE 200; 200; 20 MG/5ML; MG/5ML; MG/5ML
SUSPENSION ORAL
Qty: 355 ML | Refills: 10 | Status: SHIPPED | OUTPATIENT
Start: 2020-05-01 | End: 2020-07-09 | Stop reason: ALTCHOICE

## 2020-05-01 RX ORDER — HYDROCORTISONE 0.5 %
CREAM (GRAM) TOPICAL
Qty: 57 G | Refills: 10 | Status: SHIPPED | OUTPATIENT
Start: 2020-05-01 | End: 2020-07-09 | Stop reason: ALTCHOICE

## 2020-05-01 RX ORDER — VALPROIC ACID 250 MG/5ML
SOLUTION ORAL
Qty: 473 ML | Refills: 10 | Status: SHIPPED | OUTPATIENT
Start: 2020-05-01 | End: 2022-01-03

## 2020-05-01 RX ORDER — ACETAMINOPHEN 325 MG/1
TABLET ORAL
Qty: 60 TABLET | Refills: 10 | Status: SHIPPED | OUTPATIENT
Start: 2020-05-01

## 2020-05-01 RX ORDER — MAGNESIUM HYDROXIDE 1200 MG/15ML
SUSPENSION ORAL
Qty: 473 ML | Refills: 10 | Status: SHIPPED | OUTPATIENT
Start: 2020-05-01

## 2020-05-01 RX ORDER — LEVETIRACETAM 500 MG/1
TABLET ORAL
Qty: 62 TABLET | Refills: 10 | Status: SHIPPED | OUTPATIENT
Start: 2020-05-01

## 2020-05-01 RX ORDER — GUAIFENESIN 100 MG/5ML
LIQUID ORAL
Qty: 118 ML | Refills: 10 | Status: SHIPPED | OUTPATIENT
Start: 2020-05-01 | End: 2020-07-09 | Stop reason: ALTCHOICE

## 2020-05-01 RX ORDER — ASPIRIN 81 MG/1
TABLET, CHEWABLE ORAL
Qty: 31 TABLET | Refills: 10 | Status: SHIPPED | OUTPATIENT
Start: 2020-05-01

## 2020-05-01 RX ORDER — ALLOPURINOL 100 MG/1
TABLET ORAL
Qty: 31 TABLET | Refills: 10 | Status: SHIPPED | OUTPATIENT
Start: 2020-05-01

## 2020-05-01 RX ORDER — LISINOPRIL 5 MG/1
TABLET ORAL
Qty: 90 TABLET | Refills: 10 | Status: SHIPPED | OUTPATIENT
Start: 2020-05-01

## 2020-05-01 RX ORDER — LIDOCAINE HYDROCHLORIDE 20 MG/ML
SOLUTION ORAL; TOPICAL
Qty: 62 TABLET | Refills: 10 | Status: SHIPPED | OUTPATIENT
Start: 2020-05-01

## 2020-05-01 RX ORDER — IBUPROFEN 400 MG/1
TABLET ORAL
Qty: 30 TABLET | Refills: 10 | Status: SHIPPED | OUTPATIENT
Start: 2020-05-01

## 2020-05-01 RX ORDER — GLIMEPIRIDE 4 MG/1
TABLET ORAL
Qty: 180 TABLET | Refills: 10 | Status: SHIPPED | OUTPATIENT
Start: 2020-05-01

## 2020-05-01 NOTE — TELEPHONE ENCOUNTER
Future appt:     Your appointments     Date & Time Appointment Department [facility])    Jun 03, 2020  9:40 AM CDT Physical - Established with Soren Richardson, [address], [place] ([facility])            Noreen Teague

## 2020-07-09 ENCOUNTER — OFFICE VISIT (OUTPATIENT)
Dept: FAMILY MEDICINE CLINIC | Facility: CLINIC | Age: 35
End: 2020-07-09
Payer: MEDICAID

## 2020-07-09 ENCOUNTER — APPOINTMENT (OUTPATIENT)
Dept: LAB | Age: 35
End: 2020-07-09
Attending: FAMILY MEDICINE
Payer: MEDICAID

## 2020-07-09 VITALS
RESPIRATION RATE: 16 BRPM | TEMPERATURE: 99 F | HEART RATE: 93 BPM | HEIGHT: 63 IN | SYSTOLIC BLOOD PRESSURE: 122 MMHG | WEIGHT: 229.19 LBS | OXYGEN SATURATION: 98 % | DIASTOLIC BLOOD PRESSURE: 76 MMHG | BODY MASS INDEX: 40.61 KG/M2

## 2020-07-09 DIAGNOSIS — Z13.1 DIABETES MELLITUS SCREENING: ICD-10-CM

## 2020-07-09 DIAGNOSIS — Z00.00 WELLNESS EXAMINATION: ICD-10-CM

## 2020-07-09 DIAGNOSIS — Z13.220 LIPID SCREENING: ICD-10-CM

## 2020-07-09 DIAGNOSIS — E11.65 CONTROLLED TYPE 2 DIABETES MELLITUS WITH HYPERGLYCEMIA, WITHOUT LONG-TERM CURRENT USE OF INSULIN (HCC): ICD-10-CM

## 2020-07-09 DIAGNOSIS — Z00.00 PHYSICAL EXAM: Primary | ICD-10-CM

## 2020-07-09 DIAGNOSIS — Z13.0 SCREENING FOR DEFICIENCY ANEMIA: ICD-10-CM

## 2020-07-09 DIAGNOSIS — I10 ESSENTIAL HYPERTENSION: ICD-10-CM

## 2020-07-09 DIAGNOSIS — Z13.29 THYROID DISORDER SCREEN: ICD-10-CM

## 2020-07-09 LAB
ALBUMIN SERPL-MCNC: 4.3 G/DL (ref 3.4–5)
ALBUMIN/GLOB SERPL: 1 {RATIO} (ref 1–2)
ALP LIVER SERPL-CCNC: 47 U/L (ref 37–98)
ALT SERPL-CCNC: 29 U/L (ref 13–56)
ANION GAP SERPL CALC-SCNC: 5 MMOL/L (ref 0–18)
AST SERPL-CCNC: 13 U/L (ref 15–37)
BASOPHILS # BLD AUTO: 0.03 X10(3) UL (ref 0–0.2)
BASOPHILS NFR BLD AUTO: 0.3 %
BILIRUB SERPL-MCNC: 0.3 MG/DL (ref 0.1–2)
BUN BLD-MCNC: 11 MG/DL (ref 7–18)
BUN/CREAT SERPL: 12 (ref 10–20)
CALCIUM BLD-MCNC: 10 MG/DL (ref 8.5–10.1)
CHLORIDE SERPL-SCNC: 105 MMOL/L (ref 98–112)
CHOLEST SMN-MCNC: 135 MG/DL (ref ?–200)
CO2 SERPL-SCNC: 26 MMOL/L (ref 21–32)
CREAT BLD-MCNC: 0.92 MG/DL (ref 0.55–1.02)
DEPRECATED RDW RBC AUTO: 47.8 FL (ref 35.1–46.3)
EOSINOPHIL # BLD AUTO: 0.25 X10(3) UL (ref 0–0.7)
EOSINOPHIL NFR BLD AUTO: 2.6 %
ERYTHROCYTE [DISTWIDTH] IN BLOOD BY AUTOMATED COUNT: 14 % (ref 11–15)
EST. AVERAGE GLUCOSE BLD GHB EST-MCNC: 120 MG/DL (ref 68–126)
GLOBULIN PLAS-MCNC: 4.2 G/DL (ref 2.8–4.4)
GLUCOSE BLD-MCNC: 72 MG/DL (ref 70–99)
HBA1C MFR BLD HPLC: 5.8 % (ref ?–5.7)
HCT VFR BLD AUTO: 37.3 % (ref 35–48)
HDLC SERPL-MCNC: 24 MG/DL (ref 40–59)
HGB BLD-MCNC: 11.6 G/DL (ref 12–16)
IMM GRANULOCYTES # BLD AUTO: 0.05 X10(3) UL (ref 0–1)
IMM GRANULOCYTES NFR BLD: 0.5 %
LDLC SERPL CALC-MCNC: 71 MG/DL (ref ?–100)
LYMPHOCYTES # BLD AUTO: 3.7 X10(3) UL (ref 1–4)
LYMPHOCYTES NFR BLD AUTO: 38.3 %
M PROTEIN MFR SERPL ELPH: 8.5 G/DL (ref 6.4–8.2)
MCH RBC QN AUTO: 28.9 PG (ref 26–34)
MCHC RBC AUTO-ENTMCNC: 31.1 G/DL (ref 31–37)
MCV RBC AUTO: 93 FL (ref 80–100)
MONOCYTES # BLD AUTO: 0.54 X10(3) UL (ref 0.1–1)
MONOCYTES NFR BLD AUTO: 5.6 %
NEUTROPHILS # BLD AUTO: 5.08 X10 (3) UL (ref 1.5–7.7)
NEUTROPHILS # BLD AUTO: 5.08 X10(3) UL (ref 1.5–7.7)
NEUTROPHILS NFR BLD AUTO: 52.7 %
NONHDLC SERPL-MCNC: 111 MG/DL (ref ?–130)
OSMOLALITY SERPL CALC.SUM OF ELEC: 280 MOSM/KG (ref 275–295)
PATIENT FASTING Y/N/NP: YES
PATIENT FASTING Y/N/NP: YES
PLATELET # BLD AUTO: 353 10(3)UL (ref 150–450)
POTASSIUM SERPL-SCNC: 4.3 MMOL/L (ref 3.5–5.1)
RBC # BLD AUTO: 4.01 X10(6)UL (ref 3.8–5.3)
SODIUM SERPL-SCNC: 136 MMOL/L (ref 136–145)
TRIGL SERPL-MCNC: 201 MG/DL (ref 30–149)
TSI SER-ACNC: 1.84 MIU/ML (ref 0.36–3.74)
VLDLC SERPL CALC-MCNC: 40 MG/DL (ref 0–30)
WBC # BLD AUTO: 9.7 X10(3) UL (ref 4–11)

## 2020-07-09 PROCEDURE — 36415 COLL VENOUS BLD VENIPUNCTURE: CPT | Performed by: FAMILY MEDICINE

## 2020-07-09 PROCEDURE — 84443 ASSAY THYROID STIM HORMONE: CPT | Performed by: FAMILY MEDICINE

## 2020-07-09 PROCEDURE — 80053 COMPREHEN METABOLIC PANEL: CPT | Performed by: FAMILY MEDICINE

## 2020-07-09 PROCEDURE — 85025 COMPLETE CBC W/AUTO DIFF WBC: CPT | Performed by: FAMILY MEDICINE

## 2020-07-09 PROCEDURE — 83036 HEMOGLOBIN GLYCOSYLATED A1C: CPT | Performed by: FAMILY MEDICINE

## 2020-07-09 PROCEDURE — 80061 LIPID PANEL: CPT | Performed by: FAMILY MEDICINE

## 2020-07-09 PROCEDURE — 99395 PREV VISIT EST AGE 18-39: CPT | Performed by: FAMILY MEDICINE

## 2020-07-09 RX ORDER — IBUPROFEN 200 MG
CAPSULE ORAL
Qty: 28 G | Refills: 10 | Status: SHIPPED | OUTPATIENT
Start: 2020-07-09

## 2020-07-09 NOTE — PROGRESS NOTES
[address]    Chief Complaint:   Patient presents with:  Physical  Lab: fasting       HPI:     Hood Sena is a 38year old female who presents for an Annual Health Visit.     Patient presents with staff from [address] [place] [address] for annual exam. diazepam 5 MG Oral Tab 1 tab by mouth 30 min before procedure 1 tablet 1   • Glucose Blood (TRUE METRIX BLOOD GLUCOSE TEST) In Vitro Strip Check daily 100 strip 1   • Blood Glucose Monitoring Suppl (TRUE METRIX METER) Does not apply Device 1 Device by Othe family history.    SOCIAL HISTORY:   Social History    Tobacco Use      Smoking status: Never Smoker      Smokeless tobacco: Never Used    Alcohol use: No    Drug use: No    Social History    Patient does not qualify to have social determinant information o of insulin ([facility]    Essential hypertension    Wellness examination  -     COMP METABOLIC PANEL (14)  -     HEMOGLOBIN A1C  -     LIPID PANEL  -     TSH W REFLEX TO FREE T4  -     CBC W/ DIFFERENTIAL    Diabetes mellitus screening  -     HEMOGLOBIN A1C    L

## 2020-07-09 NOTE — PATIENT INSTRUCTIONS
Continue current medications and current care  Check labs today. Advice low carb in diet have smaller portion meal, avoid bread, pasta and potatoes, no juice or soda, don't eat late at night, exercise,  and weight loss.    Continue to monitor glucose leve

## 2020-07-09 NOTE — TELEPHONE ENCOUNTER
Future appt:    Last Appointment with provider:   7/9/2020  Last appointment at [facility]:  7/9/2020  Cholesterol, Total (mg/dL)   Date Value   07/08/2019 140     Total Cholesterol (mg/dL)   Date Value   07/11/2017 118     HDL Cholesterol (mg/dL)   Date

## 2020-07-10 ENCOUNTER — TELEPHONE (OUTPATIENT)
Dept: FAMILY MEDICINE CLINIC | Facility: CLINIC | Age: 35
End: 2020-07-10

## 2020-07-10 NOTE — TELEPHONE ENCOUNTER
----- Message from Adalberto Turner MD sent at 7/10/2020  9:29 AM CDT -----  Please inform the nurse at [address] [place] [address] that patient's hemoglobin A1c is stable at 5.8.   Total cholesterol and LDL cholesterol is normal.  Triglycerides are improving but still slightly e

## 2020-09-14 ENCOUNTER — TELEPHONE (OUTPATIENT)
Dept: FAMILY MEDICINE CLINIC | Facility: CLINIC | Age: 35
End: 2020-09-14

## 2020-09-14 RX ORDER — BLOOD-GLUCOSE METER
1 EACH MISCELLANEOUS 2 TIMES DAILY
Qty: 1 KIT | Refills: 0 | Status: SHIPPED | OUTPATIENT
Start: 2020-09-14 | End: 2021-09-14

## 2020-09-14 RX ORDER — BLOOD SUGAR DIAGNOSTIC
STRIP MISCELLANEOUS
Qty: 100 STRIP | Refills: 11 | Status: SHIPPED | OUTPATIENT
Start: 2020-09-14 | End: 2021-09-14

## 2020-09-14 NOTE — TELEPHONE ENCOUNTER
Future appt:    Last Appointment with provider:   7/9/2020  Last appointment at [facility] [place]:  7/9/2020  Cholesterol, Total (mg/dL)   Date Value   07/09/2020 135     Total Cholesterol (mg/dL)   Date Value   07/11/2017 118     HDL Cholesterol (mg/dL)   Date

## 2020-09-18 ENCOUNTER — TELEPHONE (OUTPATIENT)
Dept: FAMILY MEDICINE CLINIC | Facility: CLINIC | Age: 35
End: 2020-09-18

## 2020-09-18 NOTE — TELEPHONE ENCOUNTER
Spoke to pharmacy, verified the directions would be the same as pervious meter and strips, since the products were only changed due to model not being covered by insurance anymore.

## 2020-09-21 ENCOUNTER — TELEPHONE (OUTPATIENT)
Dept: FAMILY MEDICINE CLINIC | Facility: CLINIC | Age: 35
End: 2020-09-21

## 2020-09-21 NOTE — TELEPHONE ENCOUNTER
needs clarification on her accucheck. needs to know how many times needs to be done .  order says 3 times as needed

## 2020-09-28 ENCOUNTER — TELEPHONE (OUTPATIENT)
Dept: FAMILY MEDICINE CLINIC | Facility: CLINIC | Age: 35
End: 2020-09-28

## 2020-09-28 NOTE — TELEPHONE ENCOUNTER
See TE from 9/21. Julissa Encarnacion did not receive fax for [address] [place] [address]. Please re-fax to [phone] stating as needed.

## 2020-12-23 ENCOUNTER — TELEPHONE (OUTPATIENT)
Dept: FAMILY MEDICINE CLINIC | Facility: CLINIC | Age: 35
End: 2020-12-23

## 2020-12-23 NOTE — TELEPHONE ENCOUNTER
I informed Sangeetha Woodard that Dr Cira Ochoa that she be taken to the ER for evaluation of covid or pneumonia    She states \"okay, she will make that happen\" and call back if there are any other questions

## 2020-12-27 LAB — AMB EXT HGBA1C: 6.3 %

## 2021-01-19 ENCOUNTER — VIRTUAL PHONE E/M (OUTPATIENT)
Dept: FAMILY MEDICINE CLINIC | Facility: CLINIC | Age: 36
End: 2021-01-19
Payer: MEDICAID

## 2021-01-19 VITALS — OXYGEN SATURATION: 94 %

## 2021-01-19 DIAGNOSIS — E11.65 CONTROLLED TYPE 2 DIABETES MELLITUS WITH HYPERGLYCEMIA, WITHOUT LONG-TERM CURRENT USE OF INSULIN (HCC): ICD-10-CM

## 2021-01-19 DIAGNOSIS — U07.1 PNEUMONIA DUE TO COVID-19 VIRUS: Primary | ICD-10-CM

## 2021-01-19 DIAGNOSIS — J12.82 PNEUMONIA DUE TO COVID-19 VIRUS: Primary | ICD-10-CM

## 2021-01-19 DIAGNOSIS — D72.828 OTHER ELEVATED WHITE BLOOD CELL (WBC) COUNT: ICD-10-CM

## 2021-01-19 PROCEDURE — 99213 OFFICE O/P EST LOW 20 MIN: CPT | Performed by: FAMILY MEDICINE

## 2021-01-19 RX ORDER — OLANZAPINE 20 MG/1
20 TABLET ORAL EVERY MORNING
COMMUNITY

## 2021-01-19 RX ORDER — OLANZAPINE 15 MG/1
15 TABLET ORAL NIGHTLY
COMMUNITY

## 2021-01-19 NOTE — PATIENT INSTRUCTIONS
Continue with current care and current medication  Continue to monitor blood glucose level. Monitor pulse ox daily for 1 week and call if less than 92%. Schedule labs. Recommend to wait 90 days for COVID-19 vaccine due to recent infection.   Follow-up in

## 2021-01-20 NOTE — PROGRESS NOTES
Mesha Wolf 's representativeJuliet verbally consents to a Virtual/Telephone Check-In service on 1/19/2021. Patient understands and accepts financial responsibility for any deductible, co-insurance and/or co-pays associated with this service.     Nani Patient Instructions   Continue with current care and current medication  Continue to monitor blood glucose level. Monitor pulse ox daily for 1 week and call if less than 92%. Schedule labs.   Recommend to wait 90 days for COVID-19 vaccine due to rece

## 2021-01-27 ENCOUNTER — TELEPHONE (OUTPATIENT)
Dept: FAMILY MEDICINE CLINIC | Facility: CLINIC | Age: 36
End: 2021-01-27

## 2021-01-27 NOTE — TELEPHONE ENCOUNTER
can they get BW / CXR ( no orders )  done  4/7  prior to 3 mo appt afterwards  ( they want to do appt    virtually )    please advise       Future Appointments   Date Time Provider Renetta Gerber   4/7/2021  8:45 AM RE[facility] [place] [facility]

## 2021-02-01 NOTE — TELEPHONE ENCOUNTER
Placed call to Kristina Anderson at [address] [place] [address]. Phone rang several times and then disconnected.

## 2021-02-03 NOTE — TELEPHONE ENCOUNTER
Left a detailed phone message on Pattie ledesma. I informed her that Dr Semaj Moran is recommending a in person visit and then can do the lab work and chest x ray at the same time. Informed her to call the office back to change the appointment.

## 2021-04-07 ENCOUNTER — OFFICE VISIT (OUTPATIENT)
Dept: FAMILY MEDICINE CLINIC | Facility: CLINIC | Age: 36
End: 2021-04-07
Payer: MEDICAID

## 2021-04-07 ENCOUNTER — TELEPHONE (OUTPATIENT)
Dept: FAMILY MEDICINE CLINIC | Facility: CLINIC | Age: 36
End: 2021-04-07

## 2021-04-07 ENCOUNTER — LAB ENCOUNTER (OUTPATIENT)
Dept: LAB | Age: 36
End: 2021-04-07
Attending: FAMILY MEDICINE
Payer: MEDICAID

## 2021-04-07 ENCOUNTER — HOSPITAL ENCOUNTER (OUTPATIENT)
Dept: GENERAL RADIOLOGY | Age: 36
Discharge: HOME | End: 2021-04-07
Attending: FAMILY MEDICINE
Payer: MEDICAID

## 2021-04-07 VITALS
WEIGHT: 238 LBS | RESPIRATION RATE: 16 BRPM | TEMPERATURE: 98 F | OXYGEN SATURATION: 97 % | HEIGHT: 63 IN | BODY MASS INDEX: 42.17 KG/M2 | SYSTOLIC BLOOD PRESSURE: 122 MMHG | DIASTOLIC BLOOD PRESSURE: 80 MMHG | HEART RATE: 93 BPM

## 2021-04-07 DIAGNOSIS — Z86.16 HISTORY OF COVID-19: ICD-10-CM

## 2021-04-07 DIAGNOSIS — Z79.899 NEED FOR PROPHYLACTIC CHEMOTHERAPY: Primary | ICD-10-CM

## 2021-04-07 DIAGNOSIS — Z79.899 ENCOUNTER FOR LONG-TERM (CURRENT) DRUG USE: ICD-10-CM

## 2021-04-07 DIAGNOSIS — I10 ESSENTIAL HYPERTENSION: ICD-10-CM

## 2021-04-07 DIAGNOSIS — U07.1 PNEUMONIA DUE TO COVID-19 VIRUS: ICD-10-CM

## 2021-04-07 DIAGNOSIS — E11.65 CONTROLLED TYPE 2 DIABETES MELLITUS WITH HYPERGLYCEMIA, WITHOUT LONG-TERM CURRENT USE OF INSULIN (HCC): Primary | ICD-10-CM

## 2021-04-07 DIAGNOSIS — E11.65 CONTROLLED TYPE 2 DIABETES MELLITUS WITH HYPERGLYCEMIA, WITHOUT LONG-TERM CURRENT USE OF INSULIN (HCC): ICD-10-CM

## 2021-04-07 DIAGNOSIS — J12.82 PNEUMONIA DUE TO COVID-19 VIRUS: ICD-10-CM

## 2021-04-07 PROCEDURE — 36415 COLL VENOUS BLD VENIPUNCTURE: CPT

## 2021-04-07 PROCEDURE — 3079F DIAST BP 80-89 MM HG: CPT | Performed by: FAMILY MEDICINE

## 2021-04-07 PROCEDURE — 80053 COMPREHEN METABOLIC PANEL: CPT

## 2021-04-07 PROCEDURE — 71045 X-RAY EXAM CHEST 1 VIEW: CPT | Performed by: FAMILY MEDICINE

## 2021-04-07 PROCEDURE — 3074F SYST BP LT 130 MM HG: CPT | Performed by: FAMILY MEDICINE

## 2021-04-07 PROCEDURE — 85025 COMPLETE CBC W/AUTO DIFF WBC: CPT

## 2021-04-07 PROCEDURE — 84443 ASSAY THYROID STIM HORMONE: CPT

## 2021-04-07 PROCEDURE — 80178 ASSAY OF LITHIUM: CPT

## 2021-04-07 PROCEDURE — 3008F BODY MASS INDEX DOCD: CPT | Performed by: FAMILY MEDICINE

## 2021-04-07 PROCEDURE — 99214 OFFICE O/P EST MOD 30 MIN: CPT | Performed by: FAMILY MEDICINE

## 2021-04-07 PROCEDURE — 80061 LIPID PANEL: CPT

## 2021-04-07 PROCEDURE — 83036 HEMOGLOBIN GLYCOSYLATED A1C: CPT

## 2021-04-07 NOTE — PROGRESS NOTES
[address]  PROGRESS NOTE  Chief Complaint:   Patient presents with:   Follow - Up: 3 months       HPI:   This is a 39year old female who is currently a resident of [facility] presents with staff for follow-up on diabetes and also to have a VERIO) In Vitro Strip Use as directed.  100 strip 11   • TRIPLE ANTIBIOTIC 3.5-400-5000 External Ointment - APPLY TO WOUND TWICE DAILY 28 g 10   • ALLOPURINOL 100 MG Oral Tab - TAKE 1 TABLET BY MOUTH ONCE EVERY DAY (GOUT) 31 tablet 10   • METFORMIN  each 11   • Insulin Syringe-Needle U-100 (BD INSULIN SYRINGE) 30G X 1/2\" 0.5 ML Does not apply Misc As directed. 100 each 5   • Benztropine Mesylate 0.5 MG Oral Tab Take 1 tablet by mouth 2 (two) times daily.      • ibuprofen 100 MG/5ML Oral Suspension 1 t Future        Patient Instructions   Continue current medications and current care  Check labs today. Recheck chest xray.    Advice low carb in diet, AVOID FOOD WITH HIGH GLYCEMIC INDEX, have smaller portion meal, avoid bread, pasta and potatoes, no juice o

## 2021-04-07 NOTE — TELEPHONE ENCOUNTER
----- Message from Adair Haro MD sent at 4/7/2021  1:21 PM CDT -----  Please inform the nurse at [address] [place] [address] that patient's chest x-ray is normal, no sign of any infection or any other acute abnormality.

## 2021-04-07 NOTE — PATIENT INSTRUCTIONS
Continue current medications and current care  Check labs today. Recheck chest xray.    Advice low carb in diet, AVOID FOOD WITH HIGH GLYCEMIC INDEX, have smaller portion meal, avoid bread, pasta and potatoes, no juice or soda, don't eat late at night, exer

## 2021-04-07 NOTE — TELEPHONE ENCOUNTER
Spoke to Radha Mac, verbalized understanding of results. Asked to have a copy sent to them for their records.   Results faxed to Radha Mac

## 2021-04-08 ENCOUNTER — TELEPHONE (OUTPATIENT)
Dept: FAMILY MEDICINE CLINIC | Facility: CLINIC | Age: 36
End: 2021-04-08

## 2021-04-08 NOTE — TELEPHONE ENCOUNTER
.. Brionna Ponce  verbally consents to a Virtual/Telephone Check-In service on 4/8/2021. Patient understands and accepts financial responsibility for any deductible, co-insurance and/or co-pays associated with this service.     Future Appointments   Date T

## 2021-04-08 NOTE — TELEPHONE ENCOUNTER
----- Message from Pedro Lara MD sent at 4/8/2021 11:27 AM CDT -----  Please inform the nurse at [address] [place] [address] that patient's hemoglobin A1c is 6. 4. CBC and CMP is okay. Diabetes is stable, recommend to continue with current medications.

## 2021-07-19 ENCOUNTER — TELEPHONE (OUTPATIENT)
Dept: FAMILY MEDICINE CLINIC | Facility: CLINIC | Age: 36
End: 2021-07-19

## 2021-07-19 NOTE — TELEPHONE ENCOUNTER
Please advise    Pt needs spacific orders on when to check blood surgar-    Pt is in a group home setting and the staff need spacific times when to check pts blood sugars.     If it is a PRN pt blood sugars will only be checked if she is experiencing some k

## 2021-10-12 ENCOUNTER — TELEMEDICINE (OUTPATIENT)
Dept: FAMILY MEDICINE CLINIC | Facility: CLINIC | Age: 36
End: 2021-10-12
Payer: MEDICAID

## 2021-10-12 VITALS
TEMPERATURE: 97 F | HEART RATE: 98 BPM | SYSTOLIC BLOOD PRESSURE: 149 MMHG | WEIGHT: 236 LBS | DIASTOLIC BLOOD PRESSURE: 78 MMHG | BODY MASS INDEX: 42 KG/M2

## 2021-10-12 DIAGNOSIS — E11.65 CONTROLLED TYPE 2 DIABETES MELLITUS WITH HYPERGLYCEMIA, WITHOUT LONG-TERM CURRENT USE OF INSULIN (HCC): Primary | ICD-10-CM

## 2021-10-12 DIAGNOSIS — I10 ESSENTIAL HYPERTENSION: ICD-10-CM

## 2021-10-12 DIAGNOSIS — R09.89 RUNNY NOSE: ICD-10-CM

## 2021-10-12 PROCEDURE — 3077F SYST BP >= 140 MM HG: CPT | Performed by: FAMILY MEDICINE

## 2021-10-12 PROCEDURE — 99213 OFFICE O/P EST LOW 20 MIN: CPT | Performed by: FAMILY MEDICINE

## 2021-10-12 PROCEDURE — 3078F DIAST BP <80 MM HG: CPT | Performed by: FAMILY MEDICINE

## 2021-10-12 NOTE — PROGRESS NOTES
Thu Abbei  verbally consents to a  Video visit Check-In service on 10/12/2021. Patient understands and accepts financial responsibility for any deductible, co-insurance and/or co-pays associated with this service.     Duration of the service: 8 min pain on neck with palpation  LUNGS: No cough noted. No respiratory distress  HEART:  No heart racing or skipped beat. SKIN: No rashes, no skin lesion, no bruising   MUSCULOSKELETAL: Normal ROM, no joint pain, or muscle weakness in all extremity.      ASSE

## 2021-10-29 ENCOUNTER — TELEPHONE (OUTPATIENT)
Dept: FAMILY MEDICINE CLINIC | Facility: CLINIC | Age: 36
End: 2021-10-29

## 2021-10-29 NOTE — TELEPHONE ENCOUNTER
Information below faxed to [address] [place] [address].   Nikolay Mahoney, [address] [place] [address], 10/29/21, 2:47 PM

## 2021-11-29 ENCOUNTER — TELEPHONE (OUTPATIENT)
Dept: FAMILY MEDICINE CLINIC | Facility: CLINIC | Age: 36
End: 2021-11-29

## 2021-11-30 NOTE — TELEPHONE ENCOUNTER
Dr. Melba José out of the office. Received BP log from [address] [place] [address] with noted low readings of 104/68, 89/65, 94/64, and 91/66. Pulse rates ranging .     Please call [facility] and get update on recent reading, is on lisinopril for hypertension though bp r

## 2021-12-02 NOTE — TELEPHONE ENCOUNTER
Order for patient to have daily blood pressure readings for the next week. Please print and I will sign for fax. Send BP readings in one week.

## 2021-12-02 NOTE — TELEPHONE ENCOUNTER
Spoke to Deja Polanco, stated that the logs are from a while ago. Confirmed that they were from last month. No current BP ranges available. Deja Polanco stated to have BP logs completed would need order. Please advise on order- frequency and duration.

## 2022-01-03 RX ORDER — VALPROIC ACID 250 MG/5ML
SOLUTION ORAL
Qty: 473 ML | Refills: 0 | Status: SHIPPED | OUTPATIENT
Start: 2022-01-03

## 2022-01-03 NOTE — TELEPHONE ENCOUNTER
Future appt:     Your appointments     Date & Time Appointment Department [facility])    Davi 10, 2022 11:00 AM CST Follow up - Extended with Criselda Bone MD [address], [facility]            [place]

## 2022-01-10 ENCOUNTER — OFFICE VISIT (OUTPATIENT)
Dept: FAMILY MEDICINE CLINIC | Facility: CLINIC | Age: 37
End: 2022-01-10
Payer: MEDICAID

## 2022-01-10 VITALS
SYSTOLIC BLOOD PRESSURE: 124 MMHG | BODY MASS INDEX: 40.4 KG/M2 | HEART RATE: 94 BPM | RESPIRATION RATE: 18 BRPM | TEMPERATURE: 99 F | WEIGHT: 228 LBS | DIASTOLIC BLOOD PRESSURE: 68 MMHG | HEIGHT: 63 IN | OXYGEN SATURATION: 96 %

## 2022-01-10 DIAGNOSIS — E78.1 PURE HYPERTRIGLYCERIDEMIA: ICD-10-CM

## 2022-01-10 DIAGNOSIS — E11.65 CONTROLLED TYPE 2 DIABETES MELLITUS WITH HYPERGLYCEMIA, WITHOUT LONG-TERM CURRENT USE OF INSULIN (HCC): Primary | ICD-10-CM

## 2022-01-10 DIAGNOSIS — I10 ESSENTIAL HYPERTENSION: ICD-10-CM

## 2022-01-10 PROBLEM — U07.1 PNEUMONIA DUE TO COVID-19 VIRUS: Status: ACTIVE | Noted: 2020-12-23

## 2022-01-10 PROBLEM — J12.82 PNEUMONIA DUE TO COVID-19 VIRUS: Status: ACTIVE | Noted: 2020-12-23

## 2022-01-10 PROBLEM — D64.9 NORMOCYTIC ANEMIA: Status: ACTIVE | Noted: 2020-03-24

## 2022-01-10 PROCEDURE — 3078F DIAST BP <80 MM HG: CPT | Performed by: FAMILY MEDICINE

## 2022-01-10 PROCEDURE — 99214 OFFICE O/P EST MOD 30 MIN: CPT | Performed by: FAMILY MEDICINE

## 2022-01-10 PROCEDURE — 3074F SYST BP LT 130 MM HG: CPT | Performed by: FAMILY MEDICINE

## 2022-01-10 PROCEDURE — 3008F BODY MASS INDEX DOCD: CPT | Performed by: FAMILY MEDICINE

## 2022-01-10 NOTE — PROGRESS NOTES
[address]  PROGRESS NOTE  Chief Complaint:   Patient presents with:   Follow - Up  Blood Pressure  Blood Sugar      HPI:   This is a 39year old female presents with staff from [address] [place] [address] for follow-up on diabetes, hypertension and hypertri Take 20 mg by mouth every morning. • OLANZapine 15 MG Oral Tab Take 15 mg by mouth nightly.      • TRIPLE ANTIBIOTIC 3.5-400-5000 External Ointment - APPLY TO WOUND TWICE DAILY 28 g 10   • ALLOPURINOL 100 MG Oral Tab - TAKE 1 TABLET BY MOUTH ONCE EVERY Take 1 tablet by mouth 2 (two) times daily. • ibuprofen 100 MG/5ML Oral Suspension 1 teaspoon every 6 hours     • Lithium Carbonate 300 MG Oral Tab Take 1 tablet by mouth every 12 (twelve) hours.      • niacin 500 MG Oral Tab Take 1 tablet by mouth onofre PERRLA, EOMI. NECK: Supple, no carotid bruit, no JVD, no thyromegaly. LUNGS: Clear to auscultation bilterally, no rales/rhonchi/wheezing. HEART:  Regular rate and rhythm, S1 and S2 are normal, no murmurs, rubs or gallops.   EXTREMITIES: No edema, no cyan 11/14/2021    Patient/Caregiver Education: Patient/Caregiver Education: There are no barriers to learning. Medical education done. Outcome: Patient verbalizes understanding.  Patient is notified to call with any questions, complications, allergies, or wor

## 2022-01-10 NOTE — PATIENT INSTRUCTIONS
Continue current medications  Blood pressure ok. Continue with current care. Labs at hospital.   Continue to monitor glucose and blood pressure as needed   Return to clinic if any concern.

## 2022-02-04 ENCOUNTER — TELEPHONE (OUTPATIENT)
Dept: FAMILY MEDICINE CLINIC | Facility: CLINIC | Age: 37
End: 2022-02-04

## 2022-02-25 ENCOUNTER — TELEPHONE (OUTPATIENT)
Dept: FAMILY MEDICINE CLINIC | Facility: CLINIC | Age: 37
End: 2022-02-25

## 2022-02-25 NOTE — TELEPHONE ENCOUNTER
Please inform the nurse at [address] [place] [address] that patient's blood work shows that her hemoglobin count is low at 10.7. It has remained stable since October 2021. Rest of CBC is okay. Glucose level is elevated at 153, hemoglobin A1c is normal at 5.2. Total cholesterol is normal, triglycerides are slightly elevated at 156, LDL cholesterol is normal.  Recommend to continue with current medication and current care. Also please fax result.

## 2022-04-06 ENCOUNTER — TELEPHONE (OUTPATIENT)
Dept: FAMILY MEDICINE CLINIC | Facility: CLINIC | Age: 37
End: 2022-04-06

## 2022-04-06 NOTE — TELEPHONE ENCOUNTER
Future appt: Your appointments     Date & Time Appointment Department [facility])    Jul 06, 2022  1:00 PM CDT Physical - Established with [facility]            [id number]  [phone]        Last Appointment with provider:   1/10/2022  Last appointment at [facility] [place]:  1/10/2022  Cholesterol, Total (mg/dL)   Date Value   04/07/2021 144     Total Cholesterol (mg/dL)   Date Value   07/11/2017 118     HDL Cholesterol (mg/dL)   Date Value   04/07/2021 26 (L)   07/11/2017 31     LDL Cholesterol (mg/dL)   Date Value   04/07/2021 73   07/11/2017 62     Triglycerides (mg/dL)   Date Value   04/07/2021 226 (H)   07/11/2017 126     Lab Results   Component Value Date     (H) 04/07/2021    A1C 6.4 (H) 04/07/2021     Lab Results   Component Value Date    T4F 1.15 05/22/2018    TSH 2.170 04/07/2021       No follow-ups on file.

## 2022-04-08 ENCOUNTER — TELEPHONE (OUTPATIENT)
Dept: FAMILY MEDICINE CLINIC | Facility: CLINIC | Age: 37
End: 2022-04-08
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
## 2022-04-11 ENCOUNTER — LAB ENCOUNTER (OUTPATIENT)
Dept: LAB | Age: 37
End: 2022-04-11
Attending: FAMILY MEDICINE
Payer: MEDICAID

## 2022-04-11 DIAGNOSIS — Z77.21 EXPOSURE TO BLOOD OR BODY FLUID: ICD-10-CM

## 2022-04-11 LAB
ALBUMIN SERPL-MCNC: 4 G/DL (ref 3.4–5)
ALBUMIN/GLOB SERPL: 1.1 {RATIO} (ref 1–2)
ALP LIVER SERPL-CCNC: 50 U/L
ALT SERPL-CCNC: 20 U/L
ANION GAP SERPL CALC-SCNC: 7 MMOL/L (ref 0–18)
AST SERPL-CCNC: 7 U/L (ref 15–37)
BASOPHILS # BLD AUTO: 0.03 X10(3) UL (ref 0–0.2)
BASOPHILS NFR BLD AUTO: 0.2 %
BILIRUB SERPL-MCNC: 0.1 MG/DL (ref 0.1–2)
BUN BLD-MCNC: 17 MG/DL (ref 7–18)
CALCIUM BLD-MCNC: 9.5 MG/DL (ref 8.5–10.1)
CHLORIDE SERPL-SCNC: 106 MMOL/L (ref 98–112)
CHOLEST SERPL-MCNC: 120 MG/DL (ref ?–200)
CO2 SERPL-SCNC: 25 MMOL/L (ref 21–32)
CREAT BLD-MCNC: 1.05 MG/DL
EOSINOPHIL # BLD AUTO: 0.13 X10(3) UL (ref 0–0.7)
EOSINOPHIL NFR BLD AUTO: 1.1 %
ERYTHROCYTE [DISTWIDTH] IN BLOOD BY AUTOMATED COUNT: 13.8 %
EST. AVERAGE GLUCOSE BLD GHB EST-MCNC: 111 MG/DL (ref 68–126)
FASTING PATIENT LIPID ANSWER: NO
FASTING STATUS PATIENT QL REPORTED: NO
GLOBULIN PLAS-MCNC: 3.8 G/DL (ref 2.8–4.4)
GLUCOSE BLD-MCNC: 127 MG/DL (ref 70–99)
HAV IGM SER QL: NONREACTIVE
HBA1C MFR BLD: 5.5 % (ref ?–5.7)
HBV CORE IGM SER QL: NONREACTIVE
HBV SURFACE AG SERPL QL IA: NONREACTIVE
HCT VFR BLD AUTO: 37.3 %
HCV AB SERPL QL IA: NONREACTIVE
HDLC SERPL-MCNC: 29 MG/DL (ref 40–59)
HGB BLD-MCNC: 11.2 G/DL
IMM GRANULOCYTES # BLD AUTO: 0.05 X10(3) UL (ref 0–1)
IMM GRANULOCYTES NFR BLD: 0.4 %
LDLC SERPL CALC-MCNC: 64 MG/DL (ref ?–100)
LYMPHOCYTES # BLD AUTO: 4.71 X10(3) UL (ref 1–4)
LYMPHOCYTES NFR BLD AUTO: 39.1 %
MCH RBC QN AUTO: 28.4 PG (ref 26–34)
MCHC RBC AUTO-ENTMCNC: 30 G/DL (ref 31–37)
MCV RBC AUTO: 94.7 FL
MONOCYTES # BLD AUTO: 0.45 X10(3) UL (ref 0.1–1)
MONOCYTES NFR BLD AUTO: 3.7 %
NEUTROPHILS # BLD AUTO: 6.69 X10 (3) UL (ref 1.5–7.7)
NEUTROPHILS # BLD AUTO: 6.69 X10(3) UL (ref 1.5–7.7)
NEUTROPHILS NFR BLD AUTO: 55.5 %
NONHDLC SERPL-MCNC: 91 MG/DL (ref ?–130)
OSMOLALITY SERPL CALC.SUM OF ELEC: 289 MOSM/KG (ref 275–295)
PLATELET # BLD AUTO: 354 10(3)UL (ref 150–450)
POTASSIUM SERPL-SCNC: 4.2 MMOL/L (ref 3.5–5.1)
PROT SERPL-MCNC: 7.8 G/DL (ref 6.4–8.2)
RBC # BLD AUTO: 3.94 X10(6)UL
SODIUM SERPL-SCNC: 138 MMOL/L (ref 136–145)
TRIGL SERPL-MCNC: 156 MG/DL (ref 30–149)
VLDLC SERPL CALC-MCNC: 23 MG/DL (ref 0–30)
WBC # BLD AUTO: 12.1 X10(3) UL (ref 4–11)

## 2022-04-11 PROCEDURE — 80061 LIPID PANEL: CPT | Performed by: FAMILY MEDICINE

## 2022-04-11 PROCEDURE — 86701 HIV-1ANTIBODY: CPT

## 2022-04-11 PROCEDURE — 86702 HIV-2 ANTIBODY: CPT

## 2022-04-11 PROCEDURE — 85025 COMPLETE CBC W/AUTO DIFF WBC: CPT | Performed by: FAMILY MEDICINE

## 2022-04-11 PROCEDURE — 87536 HIV-1 QUANT&REVRSE TRNSCRPJ: CPT

## 2022-04-11 PROCEDURE — 80053 COMPREHEN METABOLIC PANEL: CPT | Performed by: FAMILY MEDICINE

## 2022-04-11 PROCEDURE — 3044F HG A1C LEVEL LT 7.0%: CPT | Performed by: FAMILY MEDICINE

## 2022-04-11 PROCEDURE — 80074 ACUTE HEPATITIS PANEL: CPT

## 2022-04-11 PROCEDURE — 36415 COLL VENOUS BLD VENIPUNCTURE: CPT | Performed by: FAMILY MEDICINE

## 2022-04-11 PROCEDURE — 83036 HEMOGLOBIN GLYCOSYLATED A1C: CPT | Performed by: FAMILY MEDICINE

## 2022-04-13 LAB
HIV-1 ANTIBODY: NEGATIVE
HIV-2 ANTIBODY: NEGATIVE

## 2022-04-14 ENCOUNTER — TELEPHONE (OUTPATIENT)
Dept: FAMILY MEDICINE CLINIC | Facility: CLINIC | Age: 37
End: 2022-04-14

## 2022-04-14 LAB
HIV-1 QNT BY NAAT (COPIES/ML): NOT DETECTED CPY/ML
HIV-1 QNT BY NAAT (LOG COPIES/ML): NOT DETECTED LOG CPY/ML
HIV-1 QNT BY NAAT INTERP: NOT DETECTED

## 2022-04-14 NOTE — TELEPHONE ENCOUNTER
----- Message from Rachid Tomlin MD sent at 4/14/2022  9:03 AM CDT -----  Please inform the nurse at [address] [place] [address] that patient's hemoglobin A1c is 5.5. Diabetes is stable. Triglycerides has improved, slightly elevated at 156, total cholesterol and LDL cholesterol is normal.Hemoglobin count is low but stable at 11.2. WBC is slightly elevated at 12,100. Monitor for any sign of infection, return to clinic if any concern. Rest the labs are okay.

## 2022-04-15 ENCOUNTER — TELEPHONE (OUTPATIENT)
Dept: FAMILY MEDICINE CLINIC | Facility: CLINIC | Age: 37
End: 2022-04-15

## 2022-04-15 NOTE — TELEPHONE ENCOUNTER
----- Message from Taz Howard MD sent at 4/14/2022  6:27 PM CDT -----  Please inform the nurse at [address] [place] [address] that patient's hepatitis A, B and HIV is negative. Please fax results.

## 2022-05-03 RX ORDER — LANCETS 33 GAUGE
EACH MISCELLANEOUS
Qty: 200 EACH | Refills: 11 | Status: SHIPPED | OUTPATIENT
Start: 2022-05-03

## 2022-05-03 NOTE — TELEPHONE ENCOUNTER
Future appt: Your appointments     Date & Time Appointment Department [facility])    Jul 06, 2022  1:00 PM CDT Physical - Established with Angela Magallon MD 25 [facility] ([facility])            25 [facility]  [facility] [id number]  [phone]         Last Appointment with provider:   1/10/2022- advised Return in about 6 months (around 7/10/2022) for physical.      Cholesterol, Total (mg/dL)   Date Value   04/11/2022 120     Total Cholesterol (mg/dL)   Date Value   07/11/2017 118     HDL Cholesterol (mg/dL)   Date Value   04/11/2022 29 (L)   07/11/2017 31     LDL Cholesterol (mg/dL)   Date Value   04/11/2022 64   07/11/2017 62     Triglycerides (mg/dL)   Date Value   04/11/2022 156 (H)   07/11/2017 126     Lab Results   Component Value Date     04/11/2022    A1C 5.5 04/11/2022     Lab Results   Component Value Date    T4F 1.15 05/22/2018    TSH 2.170 04/07/2021       No follow-ups on file.

## 2022-05-09 ENCOUNTER — TELEPHONE (OUTPATIENT)
Dept: FAMILY MEDICINE CLINIC | Facility: CLINIC | Age: 37
End: 2022-05-09

## 2022-05-09 NOTE — TELEPHONE ENCOUNTER
Received fax from [address] [place] [address] with pt blood sugar levels for April.     Fax given to Dr. Steve Arce for review

## 2022-06-21 ENCOUNTER — TELEPHONE (OUTPATIENT)
Dept: FAMILY MEDICINE CLINIC | Facility: CLINIC | Age: 37
End: 2022-06-21
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
## 2022-06-22 NOTE — TELEPHONE ENCOUNTER
Left message with Joseph Cool to inform her that we received this phone call and incoming fax. Dr Adelaide Lucas is ok with changing pt diet to regular diet, noted on fax and sent back to [address] [place] [address].     Advised to call back if any further questions or needs

## 2022-06-23 ENCOUNTER — PATIENT OUTREACH (OUTPATIENT)
Dept: FAMILY MEDICINE CLINIC | Facility: CLINIC | Age: 37
End: 2022-06-23

## 2022-07-06 ENCOUNTER — OFFICE VISIT (OUTPATIENT)
Dept: FAMILY MEDICINE CLINIC | Facility: CLINIC | Age: 37
End: 2022-07-06
Payer: MEDICAID

## 2022-07-06 VITALS
RESPIRATION RATE: 18 BRPM | BODY MASS INDEX: 40.75 KG/M2 | HEIGHT: 63 IN | WEIGHT: 230 LBS | SYSTOLIC BLOOD PRESSURE: 122 MMHG | HEART RATE: 111 BPM | DIASTOLIC BLOOD PRESSURE: 68 MMHG | TEMPERATURE: 97 F | OXYGEN SATURATION: 95 %

## 2022-07-06 DIAGNOSIS — Z00.00 PHYSICAL EXAM: Primary | ICD-10-CM

## 2022-07-06 DIAGNOSIS — E11.65 CONTROLLED TYPE 2 DIABETES MELLITUS WITH HYPERGLYCEMIA, WITHOUT LONG-TERM CURRENT USE OF INSULIN (HCC): ICD-10-CM

## 2022-07-06 DIAGNOSIS — E78.1 PURE HYPERTRIGLYCERIDEMIA: ICD-10-CM

## 2022-07-06 DIAGNOSIS — I10 ESSENTIAL HYPERTENSION: ICD-10-CM

## 2022-07-06 PROCEDURE — 3074F SYST BP LT 130 MM HG: CPT | Performed by: FAMILY MEDICINE

## 2022-07-06 PROCEDURE — 99395 PREV VISIT EST AGE 18-39: CPT | Performed by: FAMILY MEDICINE

## 2022-07-06 PROCEDURE — 3078F DIAST BP <80 MM HG: CPT | Performed by: FAMILY MEDICINE

## 2022-07-06 PROCEDURE — 3008F BODY MASS INDEX DOCD: CPT | Performed by: FAMILY MEDICINE

## 2022-07-06 RX ORDER — ALUMINUM HYDROXIDE, MAGNESIUM HYDROXIDE, DIMETHICONE 400; 400; 40 MG/10ML; MG/10ML; MG/10ML
30 SUSPENSION ORAL AS DIRECTED
COMMUNITY
Start: 2022-05-10

## 2022-07-06 RX ORDER — GLIMEPIRIDE 4 MG/1
4 TABLET ORAL
Qty: 90 TABLET | Refills: 10 | COMMUNITY
Start: 2022-07-06

## 2022-07-06 RX ORDER — QUETIAPINE FUMARATE 50 MG/1
50 TABLET, FILM COATED ORAL NIGHTLY
COMMUNITY

## 2022-07-06 RX ORDER — VITAMINS A AND D OINTMENT 15.5; 53.4 G/100G; G/100G
1 OINTMENT TOPICAL AS NEEDED
COMMUNITY

## 2022-07-06 RX ORDER — CHLORPROMAZINE HYDROCHLORIDE 100 MG/1
100 TABLET, FILM COATED ORAL 2 TIMES DAILY
COMMUNITY
Start: 2022-05-27

## 2022-07-06 NOTE — PATIENT INSTRUCTIONS
Continue current medications  Cut back on Glimepiride to 4 mg daily. Continue with current care.    Labs reviewed from hospital.

## 2022-08-15 ENCOUNTER — OFFICE VISIT (OUTPATIENT)
Dept: FAMILY MEDICINE CLINIC | Facility: CLINIC | Age: 37
End: 2022-08-15
Payer: MEDICAID

## 2022-08-15 VITALS
WEIGHT: 237 LBS | SYSTOLIC BLOOD PRESSURE: 124 MMHG | OXYGEN SATURATION: 97 % | RESPIRATION RATE: 18 BRPM | BODY MASS INDEX: 42 KG/M2 | DIASTOLIC BLOOD PRESSURE: 82 MMHG | TEMPERATURE: 98 F | HEART RATE: 123 BPM

## 2022-08-15 DIAGNOSIS — L21.0 SEBORRHEA CAPITIS IN ADULT: Primary | ICD-10-CM

## 2022-08-15 RX ORDER — CHLORPROMAZINE HYDROCHLORIDE 50 MG/1
TABLET, FILM COATED ORAL
COMMUNITY
Start: 2022-08-01

## 2022-08-16 ENCOUNTER — TELEPHONE (OUTPATIENT)
Dept: FAMILY MEDICINE CLINIC | Facility: CLINIC | Age: 37
End: 2022-08-16

## 2022-08-16 DIAGNOSIS — L21.0 SEBORRHEA CAPITIS IN ADULT: ICD-10-CM

## 2022-08-16 NOTE — TELEPHONE ENCOUNTER
Spoke with Silvana Freeman at [address] [place] [address]. Denies any drainage, redness to the eyeballs , or scratching. States both eyes are just red and puffy around the outside. Please advise.

## 2022-08-16 NOTE — TELEPHONE ENCOUNTER
seen yesterday, cecelia.  cradle cap, now has redness around both eyes, part of cradle cap? has not got the shampoo yet that was perscribed yesterday

## 2022-08-16 NOTE — TELEPHONE ENCOUNTER
Addie Esteves at [facility] informed of the below recommendations. Federico Garcia asking if it is OK to do this three times a day instead as they have 3 med passes. Please advise.

## 2022-08-16 NOTE — TELEPHONE ENCOUNTER
Can try cool compresses four times a day to see if this helps with the symptoms otherwise follow up.

## 2022-08-16 NOTE — TELEPHONE ENCOUNTER
States that pts Ketoconazole shampoo was sent to the wrong pharmacy, states that they now use [address] [place] [address] Avenue in [address] [place] [address] number [phone] and fax: [phone].

## 2022-08-23 ENCOUNTER — TELEPHONE (OUTPATIENT)
Dept: FAMILY MEDICINE CLINIC | Facility: CLINIC | Age: 37
End: 2022-08-23

## 2022-08-23 NOTE — TELEPHONE ENCOUNTER
Spoke to Radha Mac, [address] [place] [address]. Inquired about using another hair product for Mykita along with the ketoconazole.    Stated that they are wondering of can use oil or anything else along with this since he cradle cap is so bad    Please advise

## 2022-08-23 NOTE — TELEPHONE ENCOUNTER
Yes.    Can wash daily with mild baby shampoo (ex. See and [employer] hair wash). Application of emollient such as white petroleum or mineral oil overnight followed by gentle brushing with a soft brush or fine tooth comb to help with scale removal.   Keep hat off as able, staff indicated she wears the cap often she was wearing at visit.

## 2022-08-29 DIAGNOSIS — L21.0 SEBORRHEA CAPITIS IN ADULT: ICD-10-CM

## 2022-08-29 NOTE — TELEPHONE ENCOUNTER
Last appt 8/15/22     Future appt: Your appointments     Date & Time Appointment Department [facility])    Feb 24, 2023  1:00 PM CST Follow Up Visit with Ben Danielson MD 25 [facility] ([facility])            25 [facility]  [facility] [address] [phone]  [phone]        Last Appointment with provider:   7/6/2022  Last appointment at [facility] [place]:  8/15/2022  Cholesterol, Total (mg/dL)   Date Value   04/11/2022 120     Total Cholesterol (mg/dL)   Date Value   07/11/2017 118     HDL Cholesterol (mg/dL)   Date Value   04/11/2022 29 (L)   07/11/2017 31     LDL Cholesterol (mg/dL)   Date Value   04/11/2022 64   07/11/2017 62     Triglycerides (mg/dL)   Date Value   04/11/2022 156 (H)   07/11/2017 126     Lab Results   Component Value Date     04/11/2022    A1C 5.5 04/11/2022     Lab Results   Component Value Date    T4F 1.15 05/22/2018    TSH 2.170 04/07/2021       No follow-ups on file.

## 2022-08-31 ENCOUNTER — TELEPHONE (OUTPATIENT)
Dept: FAMILY MEDICINE CLINIC | Facility: CLINIC | Age: 37
End: 2022-08-31

## 2022-08-31 NOTE — TELEPHONE ENCOUNTER
Spoke to pharmacy, wanting to change ketoconazole from 1% to 2% to be covered by pt Insurance. Dr. Rossy Mcmanus approved the change.     No further questions

## 2022-08-31 NOTE — TELEPHONE ENCOUNTER
Pharmacy calling to speak to nurse regarding the rx for shampoo that was sent in today. Call sent to nurse.

## 2022-09-02 ENCOUNTER — TELEPHONE (OUTPATIENT)
Dept: FAMILY MEDICINE CLINIC | Facility: CLINIC | Age: 37
End: 2022-09-02

## 2022-09-02 RX ORDER — VALPROIC ACID 250 MG/1
250 CAPSULE, LIQUID FILLED ORAL EVERY 8 HOURS
Qty: 90 CAPSULE | Refills: 11 | Status: SHIPPED | OUTPATIENT
Start: 2022-09-02

## 2022-09-02 NOTE — TELEPHONE ENCOUNTER
Maycol [facility]   [phone] would Creasie Erin a call back regarding changing the meds to a caps or tabs.   Future Appointments   Date Time Provider Renetta Gerber   2/24/2023  1:00 PM Nav Reid MD [facility]

## 2022-09-02 NOTE — TELEPHONE ENCOUNTER
Spoke to Radha Mac at [facility]. Concern about pt Valopric acid comes to them as a solution and for the 2nd time now, pt is out of medication before she should be. Wanting to know if this can be changed to a pill form to help make more accurate dosing of medication. Please advise.

## 2022-09-06 DIAGNOSIS — L21.0 SEBORRHEA CAPITIS IN ADULT: ICD-10-CM

## 2022-09-06 NOTE — TELEPHONE ENCOUNTER
Future appt: Your appointments     Date & Time Appointment Department [facility])    Feb 24, 2023  1:00 PM CST Follow Up Visit with Elina Tapia MD 25 [facility] ([facility])            [facility] Angel  [facility] [address] [phone]  [phone]        Last Appointment with provider:   7/6/2022  Last appointment at [facility]:  8/15/2022  Cholesterol, Total (mg/dL)   Date Value   04/11/2022 120     Total Cholesterol (mg/dL)   Date Value   07/11/2017 118     HDL Cholesterol (mg/dL)   Date Value   04/11/2022 29 (L)   07/11/2017 31     LDL Cholesterol (mg/dL)   Date Value   04/11/2022 64   07/11/2017 62     Triglycerides (mg/dL)   Date Value   04/11/2022 156 (H)   07/11/2017 126     Lab Results   Component Value Date     04/11/2022    A1C 5.5 04/11/2022     Lab Results   Component Value Date    T4F 1.15 05/22/2018    TSH 2.170 04/07/2021     Last RF:  8/29/2022    No follow-ups on file.

## 2022-09-08 ENCOUNTER — TELEPHONE (OUTPATIENT)
Dept: FAMILY MEDICINE CLINIC | Facility: CLINIC | Age: 37
End: 2022-09-08

## 2022-09-08 RX ORDER — CLOBETASOL PROPIONATE 0.46 MG/ML
1 SOLUTION TOPICAL 2 TIMES DAILY PRN
Qty: 50 ML | Refills: 2 | Status: SHIPPED | OUTPATIENT
Start: 2022-09-08

## 2022-09-08 NOTE — TELEPHONE ENCOUNTER
Shae Fernandes at [facility]. Edna Hernández asked for copy of signed orders to be faxed to #[phone]  Message routed to [address] [place] [address]

## 2022-09-08 NOTE — TELEPHONE ENCOUNTER
Spoke to Radha Mac pt using Ketoconazole Rod Jimenez was told was helping, however pt scalp is irritated. They are not doing anything in between washings with product. Noted yesterday that pt has rash on her arm not sure if possibly irritated due to itching. This is causing pt to have more outbursts. Need to know if should change dosing on ketoconazole? Also noted today that Pt is having moments of \"breathing heavy\" as noted by aids. Carrington Gant stated she observed pt and did not note this however other aids did and noted that it seems to happen when she is agitated.     Please advise

## 2022-09-08 NOTE — TELEPHONE ENCOUNTER
I have sent prescription of clobetasol solution which can be applied to the scalp and rash. Recommend to monitor patient's oxygen level and continue to monitor for any increasing shortness of breath. Monitor patient's vitals. Continue with current Sig for ketoconazole. Recommend appointment if no improvement.

## 2022-09-09 ENCOUNTER — TELEPHONE (OUTPATIENT)
Dept: FAMILY MEDICINE CLINIC | Facility: CLINIC | Age: 37
End: 2022-09-09

## 2022-09-09 NOTE — TELEPHONE ENCOUNTER
Nilay Orlando called in to let our office know that they are taking pt to ER. Pt has urinated on self, red spots under breasts- look like open sores, not eating well, more irritated lately, black spots on skin.     No further questions

## 2022-10-04 DIAGNOSIS — L21.0 SEBORRHEA CAPITIS IN ADULT: ICD-10-CM

## 2022-10-04 RX ORDER — CHLORPROMAZINE HYDROCHLORIDE 200 MG/1
100 TABLET, FILM COATED ORAL DAILY
COMMUNITY
Start: 2022-10-03 | End: 2022-10-06 | Stop reason: DRUGHIGH

## 2022-10-04 RX ORDER — LORAZEPAM 1 MG/1
1 TABLET ORAL AS DIRECTED
COMMUNITY
Start: 2022-10-03 | End: 2022-10-06 | Stop reason: DRUGHIGH

## 2022-10-04 RX ORDER — LORAZEPAM 1 MG/1
2 TABLET ORAL NIGHTLY
COMMUNITY
End: 2022-10-06 | Stop reason: DRUGHIGH

## 2022-10-04 RX ORDER — LISINOPRIL 2.5 MG/1
2.5 TABLET ORAL DAILY
COMMUNITY
End: 2022-10-06 | Stop reason: DRUGHIGH

## 2022-10-04 NOTE — TELEPHONE ENCOUNTER
Received fax from [address] [place] [address] with details from pt last hospital visit on 9/9/22  Medication changes made in chart.

## 2022-10-04 NOTE — TELEPHONE ENCOUNTER
Spoke to Radha Mac, stated that pt came back to [address] [place] [address] yesterday, pt was supposed to go to short term rehab after her hospital, however ended up back at the facility. Will get BP and glucose records for the days she is back and bring them to appt. verbalized understanding that pt is to stay on Lisinopril 5mg daily until appt where will be discussed.      Needs refill completed      No further questions

## 2022-10-04 NOTE — TELEPHONE ENCOUNTER
Lm for Addie Esteves to call back,    Need to set Hospital F/U appt  Need to review lisinopril- have pt continue with 5mg daily as there are multiple dosages listed on fax sent today  Need to get list of BP readings and glucose over past week.

## 2022-10-06 ENCOUNTER — OFFICE VISIT (OUTPATIENT)
Dept: FAMILY MEDICINE CLINIC | Facility: CLINIC | Age: 37
End: 2022-10-06
Payer: MEDICAID

## 2022-10-06 ENCOUNTER — TELEPHONE (OUTPATIENT)
Dept: FAMILY MEDICINE CLINIC | Facility: CLINIC | Age: 37
End: 2022-10-06

## 2022-10-06 VITALS
SYSTOLIC BLOOD PRESSURE: 104 MMHG | BODY MASS INDEX: 40.75 KG/M2 | HEIGHT: 63 IN | WEIGHT: 230 LBS | TEMPERATURE: 97 F | RESPIRATION RATE: 22 BRPM | HEART RATE: 107 BPM | DIASTOLIC BLOOD PRESSURE: 70 MMHG | OXYGEN SATURATION: 99 %

## 2022-10-06 DIAGNOSIS — F63.81 INTERMITTENT EXPLOSIVE DISORDER: ICD-10-CM

## 2022-10-06 DIAGNOSIS — E11.65 CONTROLLED TYPE 2 DIABETES MELLITUS WITH HYPERGLYCEMIA, WITHOUT LONG-TERM CURRENT USE OF INSULIN (HCC): Primary | ICD-10-CM

## 2022-10-06 DIAGNOSIS — I10 ESSENTIAL HYPERTENSION: Primary | ICD-10-CM

## 2022-10-06 DIAGNOSIS — E11.65 CONTROLLED TYPE 2 DIABETES MELLITUS WITH HYPERGLYCEMIA, WITHOUT LONG-TERM CURRENT USE OF INSULIN (HCC): ICD-10-CM

## 2022-10-06 PROCEDURE — 3008F BODY MASS INDEX DOCD: CPT | Performed by: FAMILY MEDICINE

## 2022-10-06 PROCEDURE — 3074F SYST BP LT 130 MM HG: CPT | Performed by: FAMILY MEDICINE

## 2022-10-06 PROCEDURE — 99214 OFFICE O/P EST MOD 30 MIN: CPT | Performed by: FAMILY MEDICINE

## 2022-10-06 PROCEDURE — 3078F DIAST BP <80 MM HG: CPT | Performed by: FAMILY MEDICINE

## 2022-10-06 RX ORDER — LORAZEPAM 2 MG/1
2 TABLET ORAL 2 TIMES DAILY
COMMUNITY
End: 2022-10-06

## 2022-10-06 RX ORDER — OLANZAPINE 15 MG/1
15 TABLET ORAL NIGHTLY
COMMUNITY

## 2022-10-06 RX ORDER — LISINOPRIL 5 MG/1
5 TABLET ORAL DAILY
COMMUNITY

## 2022-10-06 RX ORDER — LORAZEPAM 2 MG/1
2 TABLET ORAL 2 TIMES DAILY
Qty: 30 TABLET | Refills: 0 | Status: SHIPPED | OUTPATIENT
Start: 2022-10-06 | End: 2022-10-07

## 2022-10-06 RX ORDER — OLANZAPINE 5 MG/1
5 TABLET, ORALLY DISINTEGRATING ORAL NIGHTLY
COMMUNITY

## 2022-10-06 RX ORDER — CHLORPROMAZINE HYDROCHLORIDE 100 MG/1
100 TABLET, FILM COATED ORAL NIGHTLY
Qty: 90 TABLET | Refills: 0 | COMMUNITY
Start: 2022-10-06

## 2022-10-06 NOTE — TELEPHONE ENCOUNTER
Mohsen Morton forgot to ask for referral to endo dr and diabetic education center at pt's appt earlier today

## 2022-10-06 NOTE — TELEPHONE ENCOUNTER
Needs referrals      Attempted to reach [facility], no answer no voice mail.     Need to also know if Nithya Oquendo needs papers back that were brought into visit today

## 2022-10-06 NOTE — PATIENT INSTRUCTIONS
Decrease Ativan to 2 mg twice a day then follow up with Dr Norman Barajas for further evaluation. Monitor glucose twice a day. Check blood pressure daily for 2 weeks and then as needed   Continue with current medication and current care. Follow up in 3 months for recheck.

## 2022-10-07 ENCOUNTER — TELEPHONE (OUTPATIENT)
Dept: FAMILY MEDICINE CLINIC | Facility: CLINIC | Age: 37
End: 2022-10-07

## 2022-10-07 RX ORDER — LORAZEPAM 1 MG/1
2 TABLET ORAL 2 TIMES DAILY
Qty: 60 TABLET | Refills: 0 | Status: SHIPPED | OUTPATIENT
Start: 2022-10-07

## 2022-10-07 NOTE — TELEPHONE ENCOUNTER
Received fax from [facility] needing prior auth for Lorazepam.

## 2022-10-07 NOTE — TELEPHONE ENCOUNTER
Attempted to reach insurance for prior authorization.  Unable to complete call as it disconnected several times

## 2022-10-07 NOTE — TELEPHONE ENCOUNTER
Spoke to Radha Mac,  Stated that they have ample of the 1mg Lorazepam in stock for pt. Stated that they need to have a new script sent to [facility] stating:    Lorazepam 1mg  Take 2 tablets BID    Ladjaime Has stated that would work for the pharmacy and she has talked to [facility] already about this.      Please advise

## 2022-10-13 ENCOUNTER — TELEPHONE (OUTPATIENT)
Dept: FAMILY MEDICINE CLINIC | Facility: CLINIC | Age: 37
End: 2022-10-13

## 2022-10-13 RX ORDER — FLUOCINOLONE ACETONIDE 0.11 MG/ML
OIL TOPICAL
Qty: 118 ML | Refills: 2 | Status: SHIPPED | OUTPATIENT
Start: 2022-10-13

## 2022-10-13 NOTE — TELEPHONE ENCOUNTER
Recommend not to apply Vaseline on scalp. I have a prescribe topical steroid oil that can be used on scalp. I have sent prescription to pharmacy. Okay to use triple antibiotic ointment on skin tear.

## 2022-10-13 NOTE — TELEPHONE ENCOUNTER
Marce Abelson called in regarding pt stated that pt is using Ketoconazole shampoo and staff has noted that scalp appears to be looking ashy, flaky and itchy. They have used Vaseline on scalp due to being scalp and gave buzz cut since hair was coming out in patches. Wanting to know if ok to be using Vaseline. Also- discovered while working with hair that pt has a skin tear on top of her ear where ear meets scalp- thinking likely due to wearing face mask or glasses. area is not tunneled in and was not there 2 days ago. About 1/4 inch long. Wanting to know if ok to use triple antibiotic ointment on area and would need order for that.      Please advise

## 2022-10-19 ENCOUNTER — TELEPHONE (OUTPATIENT)
Dept: FAMILY MEDICINE CLINIC | Facility: CLINIC | Age: 37
End: 2022-10-19

## 2022-10-19 NOTE — TELEPHONE ENCOUNTER
received today from [address] [place] [address] medication list for pt to be reviewed and signed off by Dr. Sneha Gottlieb. 2 changes/questions:    Glipizide ER 5 mg BID with meals is not on the list from [address] [place] [address], is on our list. Need to verify    Chlorpromazine 200 mg daily at breakfast is on the list from [address] [place] [address], our list has this mediation listed at 100 mg daily at breakfast.     LM for Brock Alvaradors asking to verify medications in question and to send updated medication list for Dr. Sneha Gottlieb to sign off.

## 2022-10-20 NOTE — TELEPHONE ENCOUNTER
New list sent from [address] [place] [address], given to Dr Maicol Gottlieb, reviewed, signed and sent back

## 2022-10-26 RX ORDER — PIOGLITAZONEHYDROCHLORIDE 45 MG/1
45 TABLET ORAL DAILY
Qty: 30 TABLET | Refills: 11 | Status: SHIPPED | OUTPATIENT
Start: 2022-10-26 | End: 2023-10-26

## 2022-10-26 RX ORDER — METOPROLOL SUCCINATE 25 MG/1
25 TABLET, EXTENDED RELEASE ORAL DAILY
Qty: 30 TABLET | Refills: 11 | Status: SHIPPED | OUTPATIENT
Start: 2022-10-26 | End: 2023-10-26

## 2022-10-26 NOTE — TELEPHONE ENCOUNTER
Requesting [facility] Refill-----   Not on Medication List.   Please advise. Metformin/Metoprolol/Actos. [facility] Resident. Return in about 3 months (around 1/6/2023) for follow up. Future appt: Your appointments     Date & Time Appointment Department [facility])    Feb 24, 2023  1:00 PM CST Follow Up Visit with Sheree Guzman MD 25 [facility])            25 [facility] Jeanne  [facility] [address] [phone]  [phone]        Last Appointment with provider:   10/6/2022  Last appointment at [facility]:  10/6/2022  Cholesterol, Total (mg/dL)   Date Value   04/11/2022 120     Total Cholesterol (mg/dL)   Date Value   07/11/2017 118     HDL Cholesterol (mg/dL)   Date Value   04/11/2022 29 (L)   07/11/2017 31     LDL Cholesterol (mg/dL)   Date Value   04/11/2022 64   07/11/2017 62     Triglycerides (mg/dL)   Date Value   04/11/2022 156 (H)   07/11/2017 126     Lab Results   Component Value Date     04/11/2022    A1C 5.5 04/11/2022     Lab Results   Component Value Date    T4F 1.15 05/22/2018    TSH 2.170 04/07/2021       No follow-ups on file.

## 2022-10-27 ENCOUNTER — TELEPHONE (OUTPATIENT)
Dept: FAMILY MEDICINE CLINIC | Facility: CLINIC | Age: 37
End: 2022-10-27

## 2022-10-27 RX ORDER — LORAZEPAM 2 MG/1
2 TABLET ORAL 2 TIMES DAILY
COMMUNITY

## 2022-10-27 NOTE — TELEPHONE ENCOUNTER
Heather from [facility] [[email or web address] called with a updated ID #[id number] got Lorazepam 2mg. Page Splinter any questions her number is [phone]     Received message above     Contacted Heather from [facility] [[email or web address]] to verify that they have changed the Lorazepam order in their system to the 2mg BID. Order was originially put in for 2mg BID, insurance denied due to number of pills, dosage was changed to 1mg take 2 tabs BID.   stated that now they are able to do the 2mg tabs BID. Medication list updated  Claim ID# [id number].

## 2022-11-01 DIAGNOSIS — Z30.9 ENCOUNTER FOR CONTRACEPTIVE MANAGEMENT, UNSPECIFIED TYPE: Primary | ICD-10-CM

## 2022-11-01 RX ORDER — MEDROXYPROGESTERONE ACETATE 150 MG/ML
150 INJECTION, SUSPENSION INTRAMUSCULAR
Qty: 1 EACH | Refills: 3 | Status: SHIPPED | OUTPATIENT
Start: 2022-12-01

## 2022-11-01 RX ORDER — MEDROXYPROGESTERONE ACETATE 150 MG/ML
150 INJECTION, SUSPENSION INTRAMUSCULAR ONCE
Status: SHIPPED | OUTPATIENT
Start: 2022-12-01

## 2023-01-06 ENCOUNTER — TELEPHONE (OUTPATIENT)
Dept: FAMILY MEDICINE CLINIC | Facility: CLINIC | Age: 38
End: 2023-01-06

## 2023-01-06 DIAGNOSIS — Z20.822 ENCOUNTER FOR SCREENING LABORATORY TESTING FOR COVID-19 VIRUS IN ASYMPTOMATIC PATIENT: Primary | ICD-10-CM

## 2023-01-09 ENCOUNTER — TELEPHONE (OUTPATIENT)
Dept: FAMILY MEDICINE CLINIC | Facility: CLINIC | Age: 38
End: 2023-01-09

## 2023-01-09 NOTE — TELEPHONE ENCOUNTER
Had COVID test done 1/8/2023 that Dr Kiki Sena ordered. When the results come in, please fax to [phone].

## 2023-01-11 ENCOUNTER — TELEPHONE (OUTPATIENT)
Dept: FAMILY MEDICINE CLINIC | Facility: CLINIC | Age: 38
End: 2023-01-11

## 2023-01-11 NOTE — TELEPHONE ENCOUNTER
COVID test results still have not been faxed. Patient had test done Sunday 1/8/2023.   Please fax results to [phone]

## 2023-02-23 ENCOUNTER — TELEPHONE (OUTPATIENT)
Dept: FAMILY MEDICINE CLINIC | Facility: CLINIC | Age: 38
End: 2023-02-23

## 2023-02-23 NOTE — TELEPHONE ENCOUNTER
Spoke to Radha Mac pt is in Short term stabalization housing right now, [place]. [facility] staff is bringing Larissa to appt tomorrow. They have concerns about her hair and what has been used. They need a referral for hearing testing. Thalia Ochoa also noted that Jori Taylor may want to discuss the pt Keppra feeling that it may be causing her aggression. Thalia Ochoa stated that she figured Neuro would have to address this.     Wanted to make you aware before tomorrows appt

## 2023-02-24 ENCOUNTER — OFFICE VISIT (OUTPATIENT)
Dept: FAMILY MEDICINE CLINIC | Facility: CLINIC | Age: 38
End: 2023-02-24
Payer: MEDICAID

## 2023-02-24 ENCOUNTER — LAB ENCOUNTER (OUTPATIENT)
Dept: LAB | Age: 38
End: 2023-02-24
Attending: FAMILY MEDICINE
Payer: MEDICAID

## 2023-02-24 VITALS
BODY MASS INDEX: 39.83 KG/M2 | HEIGHT: 63 IN | WEIGHT: 224.81 LBS | SYSTOLIC BLOOD PRESSURE: 126 MMHG | OXYGEN SATURATION: 100 % | RESPIRATION RATE: 16 BRPM | DIASTOLIC BLOOD PRESSURE: 78 MMHG | TEMPERATURE: 98 F | HEART RATE: 100 BPM

## 2023-02-24 DIAGNOSIS — I10 ESSENTIAL HYPERTENSION: ICD-10-CM

## 2023-02-24 DIAGNOSIS — F71 MODERATE INTELLECTUAL DISABILITY: ICD-10-CM

## 2023-02-24 DIAGNOSIS — E11.65 CONTROLLED TYPE 2 DIABETES MELLITUS WITH HYPERGLYCEMIA, WITHOUT LONG-TERM CURRENT USE OF INSULIN (HCC): ICD-10-CM

## 2023-02-24 DIAGNOSIS — F63.81 INTERMITTENT EXPLOSIVE DISORDER: ICD-10-CM

## 2023-02-24 DIAGNOSIS — H91.90 HEARING LOSS, UNSPECIFIED HEARING LOSS TYPE, UNSPECIFIED LATERALITY: ICD-10-CM

## 2023-02-24 DIAGNOSIS — I10 ESSENTIAL HYPERTENSION: Primary | ICD-10-CM

## 2023-02-24 DIAGNOSIS — L21.0 SEBORRHEA CAPITIS IN ADULT: ICD-10-CM

## 2023-02-24 LAB
ALBUMIN SERPL-MCNC: 3.6 G/DL (ref 3.4–5)
ALBUMIN/GLOB SERPL: 0.9 {RATIO} (ref 1–2)
ALP LIVER SERPL-CCNC: 34 U/L
ALT SERPL-CCNC: 19 U/L
ANION GAP SERPL CALC-SCNC: 5 MMOL/L (ref 0–18)
AST SERPL-CCNC: 12 U/L (ref 15–37)
BASOPHILS # BLD AUTO: 0.02 X10(3) UL (ref 0–0.2)
BASOPHILS NFR BLD AUTO: 0.3 %
BILIRUB SERPL-MCNC: 0.2 MG/DL (ref 0.1–2)
BUN BLD-MCNC: 21 MG/DL (ref 7–18)
CALCIUM BLD-MCNC: 9.4 MG/DL (ref 8.5–10.1)
CHLORIDE SERPL-SCNC: 111 MMOL/L (ref 98–112)
CO2 SERPL-SCNC: 22 MMOL/L (ref 21–32)
CREAT BLD-MCNC: 1.05 MG/DL
EOSINOPHIL # BLD AUTO: 0.13 X10(3) UL (ref 0–0.7)
EOSINOPHIL NFR BLD AUTO: 1.7 %
ERYTHROCYTE [DISTWIDTH] IN BLOOD BY AUTOMATED COUNT: 14.1 %
EST. AVERAGE GLUCOSE BLD GHB EST-MCNC: 94 MG/DL (ref 68–126)
GFR SERPLBLD BASED ON 1.73 SQ M-ARVRAT: 69 ML/MIN/1.73M2 (ref 60–?)
GLOBULIN PLAS-MCNC: 4.1 G/DL (ref 2.8–4.4)
GLUCOSE BLD-MCNC: 70 MG/DL (ref 70–99)
HBA1C MFR BLD: 4.9 % (ref ?–5.7)
HCT VFR BLD AUTO: 36.7 %
HGB BLD-MCNC: 11.7 G/DL
IMM GRANULOCYTES # BLD AUTO: 0.05 X10(3) UL (ref 0–1)
IMM GRANULOCYTES NFR BLD: 0.6 %
LYMPHOCYTES # BLD AUTO: 3.5 X10(3) UL (ref 1–4)
LYMPHOCYTES NFR BLD AUTO: 44.9 %
MCH RBC QN AUTO: 30 PG (ref 26–34)
MCHC RBC AUTO-ENTMCNC: 31.9 G/DL (ref 31–37)
MCV RBC AUTO: 94.1 FL
MONOCYTES # BLD AUTO: 0.41 X10(3) UL (ref 0.1–1)
MONOCYTES NFR BLD AUTO: 5.3 %
NEUTROPHILS # BLD AUTO: 3.68 X10 (3) UL (ref 1.5–7.7)
NEUTROPHILS # BLD AUTO: 3.68 X10(3) UL (ref 1.5–7.7)
NEUTROPHILS NFR BLD AUTO: 47.2 %
OSMOLALITY SERPL CALC.SUM OF ELEC: 287 MOSM/KG (ref 275–295)
PLATELET # BLD AUTO: 313 10(3)UL (ref 150–450)
POTASSIUM SERPL-SCNC: 4.6 MMOL/L (ref 3.5–5.1)
PROT SERPL-MCNC: 7.7 G/DL (ref 6.4–8.2)
RBC # BLD AUTO: 3.9 X10(6)UL
SODIUM SERPL-SCNC: 138 MMOL/L (ref 136–145)
WBC # BLD AUTO: 7.8 X10(3) UL (ref 4–11)

## 2023-02-24 PROCEDURE — 3074F SYST BP LT 130 MM HG: CPT | Performed by: FAMILY MEDICINE

## 2023-02-24 PROCEDURE — 85025 COMPLETE CBC W/AUTO DIFF WBC: CPT

## 2023-02-24 PROCEDURE — 3008F BODY MASS INDEX DOCD: CPT | Performed by: FAMILY MEDICINE

## 2023-02-24 PROCEDURE — 3078F DIAST BP <80 MM HG: CPT | Performed by: FAMILY MEDICINE

## 2023-02-24 PROCEDURE — 3044F HG A1C LEVEL LT 7.0%: CPT | Performed by: FAMILY MEDICINE

## 2023-02-24 PROCEDURE — 83036 HEMOGLOBIN GLYCOSYLATED A1C: CPT

## 2023-02-24 PROCEDURE — 36415 COLL VENOUS BLD VENIPUNCTURE: CPT

## 2023-02-24 PROCEDURE — 99214 OFFICE O/P EST MOD 30 MIN: CPT | Performed by: FAMILY MEDICINE

## 2023-02-24 PROCEDURE — 80053 COMPREHEN METABOLIC PANEL: CPT

## 2023-02-24 RX ORDER — FLUOCINOLONE ACETONIDE 0.11 MG/ML
OIL TOPICAL
Qty: 118 ML | Refills: 2 | Status: SHIPPED | OUTPATIENT
Start: 2023-02-24

## 2023-02-24 RX ORDER — KETOCONAZOLE 20 MG/ML
5 SHAMPOO TOPICAL
Qty: 120 ML | Refills: 2 | Status: SHIPPED | OUTPATIENT
Start: 2023-02-24

## 2023-02-24 NOTE — PATIENT INSTRUCTIONS
Continue current medications. Continue with current care. Check labs today. Schedule appointment with ENT for hearing loss eval.     Apply shampoo 2 times a week. Apply medicated oil daily until improvement. See dermatologist if no improvement. Return to clinic if any concern.

## 2023-04-14 ENCOUNTER — TELEPHONE (OUTPATIENT)
Dept: FAMILY MEDICINE CLINIC | Facility: CLINIC | Age: 38
End: 2023-04-14

## 2023-04-14 NOTE — TELEPHONE ENCOUNTER
received fax from [facility] requesting order for Debrox for pt. LM for [facility] to call back to confirm if 1 ear (and if so which) or if for both ears.

## 2023-06-12 ENCOUNTER — OFFICE VISIT (OUTPATIENT)
Facility: LOCATION | Age: 38
End: 2023-06-12
Payer: MEDICAID

## 2023-06-12 DIAGNOSIS — H93.293 ABNORMAL AUDITORY PERCEPTION, BILATERAL: Primary | ICD-10-CM

## 2023-06-12 DIAGNOSIS — H91.93 AUDITORY IMPAIRMENT, BILATERAL: Primary | ICD-10-CM

## 2023-06-12 DIAGNOSIS — H61.23 BILATERAL IMPACTED CERUMEN: ICD-10-CM

## 2023-06-12 PROCEDURE — 99243 OFF/OP CNSLTJ NEW/EST LOW 30: CPT | Performed by: OTOLARYNGOLOGY

## 2023-06-12 NOTE — PROGRESS NOTES
Sanders Bamberger was seen for an audiometric evaluation and tympanogram today. Referred back to physician.     Umm Adjutant, Saturnino

## 2023-06-14 DIAGNOSIS — H61.23 BILATERAL IMPACTED CERUMEN: Primary | ICD-10-CM

## 2023-07-03 RX ORDER — OLANZAPINE 10 MG/1
10 TABLET ORAL NIGHTLY
COMMUNITY

## 2023-07-05 ENCOUNTER — TELEPHONE (OUTPATIENT)
Dept: FAMILY MEDICINE CLINIC | Facility: CLINIC | Age: 38
End: 2023-07-05

## 2023-07-05 NOTE — TELEPHONE ENCOUNTER
Received fax from Dr. Oscar Caldwell office pt needing a pre-op appt for removal of cerumen under MAC scheduled for 7/14/23    Only appt open is with Dr. Kirk Velásquez on 7/11/23 at [address] [place] [address],[address] scheduled to hold.     LM for Myriam Youssef to call back to confirm appt. '

## 2023-07-11 ENCOUNTER — OFFICE VISIT (OUTPATIENT)
Dept: FAMILY MEDICINE CLINIC | Facility: CLINIC | Age: 38
End: 2023-07-11
Payer: MEDICAID

## 2023-07-11 VITALS
HEIGHT: 63 IN | WEIGHT: 226.38 LBS | BODY MASS INDEX: 40.11 KG/M2 | TEMPERATURE: 98 F | DIASTOLIC BLOOD PRESSURE: 74 MMHG | RESPIRATION RATE: 18 BRPM | HEART RATE: 84 BPM | OXYGEN SATURATION: 99 % | SYSTOLIC BLOOD PRESSURE: 122 MMHG

## 2023-07-11 DIAGNOSIS — E11.65 CONTROLLED TYPE 2 DIABETES MELLITUS WITH HYPERGLYCEMIA, WITHOUT LONG-TERM CURRENT USE OF INSULIN (HCC): ICD-10-CM

## 2023-07-11 DIAGNOSIS — H61.23 BILATERAL IMPACTED CERUMEN: ICD-10-CM

## 2023-07-11 DIAGNOSIS — Z01.818 PREOP EXAMINATION: Primary | ICD-10-CM

## 2023-07-11 DIAGNOSIS — F71 MODERATE INTELLECTUAL DISABILITY: ICD-10-CM

## 2023-07-11 DIAGNOSIS — H91.90 HEARING LOSS, UNSPECIFIED HEARING LOSS TYPE, UNSPECIFIED LATERALITY: ICD-10-CM

## 2023-07-11 DIAGNOSIS — R47.01 NONVERBAL: ICD-10-CM

## 2023-07-11 PROBLEM — Z86.16 HISTORY OF COVID-19: Status: RESOLVED | Noted: 2021-04-07 | Resolved: 2023-07-11

## 2023-07-11 PROCEDURE — 3008F BODY MASS INDEX DOCD: CPT | Performed by: FAMILY MEDICINE

## 2023-07-11 PROCEDURE — 3074F SYST BP LT 130 MM HG: CPT | Performed by: FAMILY MEDICINE

## 2023-07-11 PROCEDURE — 93000 ELECTROCARDIOGRAM COMPLETE: CPT | Performed by: FAMILY MEDICINE

## 2023-07-11 PROCEDURE — 3078F DIAST BP <80 MM HG: CPT | Performed by: FAMILY MEDICINE

## 2023-07-11 PROCEDURE — 99215 OFFICE O/P EST HI 40 MIN: CPT | Performed by: FAMILY MEDICINE

## 2023-07-11 NOTE — PATIENT INSTRUCTIONS
Pt exam and hx reviewed  Pt needing EKG and labs prior to ent procedure.   Orders to [facility], anesthesia consult if needed

## 2023-07-12 ENCOUNTER — TELEPHONE (OUTPATIENT)
Dept: FAMILY MEDICINE CLINIC | Facility: CLINIC | Age: 38
End: 2023-07-12

## 2023-07-12 NOTE — TELEPHONE ENCOUNTER
Spoke with Serenity Baez at [employer] earlier this afternoon. Pt was not able to get labs or EKG completed at [facility] yesterday ( pt is special needs) and services were not re attempted today. Serenity Baez stated pt's surgery was canceled.   LM for pre-admission

## 2023-07-12 NOTE — TELEPHONE ENCOUNTER
Jenna Ornelas informed pt was seen yesterday. Pt was given orders for labs and EKG to be done at [facility]. Pt surgery is Friday, 7/14. Results pending.

## 2023-07-13 DIAGNOSIS — H61.23 IMPACTED CERUMEN OF BOTH EARS: Primary | ICD-10-CM

## 2023-07-14 NOTE — TELEPHONE ENCOUNTER
Spoke with [employer]. Pt is now rescheduled for surgery on 8/11/23. To await labs and EKG as it was ordered on 7/11/23.

## 2023-07-21 ENCOUNTER — TELEPHONE (OUTPATIENT)
Dept: FAMILY MEDICINE CLINIC | Facility: CLINIC | Age: 38
End: 2023-07-21

## 2023-07-21 DIAGNOSIS — H61.23 IMPACTED CERUMEN OF BOTH EARS: Primary | ICD-10-CM

## 2023-07-21 NOTE — TELEPHONE ENCOUNTER
Call from [facility] from [facility]. Pt was seen for pre-op appt on 7/11 with CR. Pt did not tolerated EKG and did not allow for labs to be drawn. Attempted to have pt get EKG and labs draw at [facility] - pt again was not able to tolerate. Pt surgery on 7/14 was cancelled. Spoke with nurse [facility] today-  Ben Beckwith states she feels pt did not tolerate in office gown - would like to try again without gown. Ben Beckwith will also make sure pt arrives at appt with caretaker pt is most comfortable with. Ben Beckwith asked if we can re attempt EKG and labs in office. Ben Beckwith informed CR is out of office. Pt is scheduled with DR. Yovana Castle on Monday afternoon. Ben Beckwith states she will get surgery rescheduled once we are able to get EKG and labs drawn.

## 2023-07-24 ENCOUNTER — OFFICE VISIT (OUTPATIENT)
Dept: FAMILY MEDICINE CLINIC | Facility: CLINIC | Age: 38
End: 2023-07-24
Payer: MEDICAID

## 2023-07-24 VITALS
HEIGHT: 63 IN | OXYGEN SATURATION: 98 % | TEMPERATURE: 98 F | SYSTOLIC BLOOD PRESSURE: 126 MMHG | DIASTOLIC BLOOD PRESSURE: 78 MMHG | HEART RATE: 117 BPM | RESPIRATION RATE: 18 BRPM | WEIGHT: 229.38 LBS | BODY MASS INDEX: 40.64 KG/M2

## 2023-07-24 DIAGNOSIS — H61.23 BILATERAL IMPACTED CERUMEN: ICD-10-CM

## 2023-07-24 DIAGNOSIS — R47.01 NONVERBAL: ICD-10-CM

## 2023-07-24 DIAGNOSIS — F71 MODERATE INTELLECTUAL DISABILITY: ICD-10-CM

## 2023-07-24 DIAGNOSIS — I10 ESSENTIAL HYPERTENSION: ICD-10-CM

## 2023-07-24 DIAGNOSIS — F63.81 INTERMITTENT EXPLOSIVE DISORDER: ICD-10-CM

## 2023-07-24 DIAGNOSIS — Z01.818 PREOP EXAMINATION: Primary | ICD-10-CM

## 2023-07-24 DIAGNOSIS — E11.65 CONTROLLED TYPE 2 DIABETES MELLITUS WITH HYPERGLYCEMIA, WITHOUT LONG-TERM CURRENT USE OF INSULIN (HCC): ICD-10-CM

## 2023-07-24 LAB
ALBUMIN SERPL-MCNC: 3.8 G/DL (ref 3.4–5)
ALBUMIN/GLOB SERPL: 1 {RATIO} (ref 1–2)
ALP LIVER SERPL-CCNC: 38 U/L
ALT SERPL-CCNC: 19 U/L
ANION GAP SERPL CALC-SCNC: 7 MMOL/L (ref 0–18)
AST SERPL-CCNC: 5 U/L (ref 15–37)
ATRIAL RATE: 112 BPM
BASOPHILS # BLD AUTO: 0.03 X10(3) UL (ref 0–0.2)
BASOPHILS NFR BLD AUTO: 0.3 %
BILIRUB SERPL-MCNC: 0.2 MG/DL (ref 0.1–2)
BUN BLD-MCNC: 19 MG/DL (ref 7–18)
CALCIUM BLD-MCNC: 9.7 MG/DL (ref 8.5–10.1)
CHLORIDE SERPL-SCNC: 109 MMOL/L (ref 98–112)
CO2 SERPL-SCNC: 20 MMOL/L (ref 21–32)
CREAT BLD-MCNC: 1.2 MG/DL
EGFRCR SERPLBLD CKD-EPI 2021: 59 ML/MIN/1.73M2 (ref 60–?)
EOSINOPHIL # BLD AUTO: 0.31 X10(3) UL (ref 0–0.7)
EOSINOPHIL NFR BLD AUTO: 3.6 %
ERYTHROCYTE [DISTWIDTH] IN BLOOD BY AUTOMATED COUNT: 13.3 %
EST. AVERAGE GLUCOSE BLD GHB EST-MCNC: 105 MG/DL (ref 68–126)
FASTING STATUS PATIENT QL REPORTED: YES
GLOBULIN PLAS-MCNC: 4 G/DL (ref 2.8–4.4)
GLUCOSE BLD-MCNC: 120 MG/DL (ref 70–99)
HBA1C MFR BLD: 5.3 % (ref ?–5.7)
HCT VFR BLD AUTO: 39.1 %
HGB BLD-MCNC: 12 G/DL
IMM GRANULOCYTES # BLD AUTO: 0.03 X10(3) UL (ref 0–1)
IMM GRANULOCYTES NFR BLD: 0.3 %
LYMPHOCYTES # BLD AUTO: 3.42 X10(3) UL (ref 1–4)
LYMPHOCYTES NFR BLD AUTO: 39.4 %
MCH RBC QN AUTO: 29.9 PG (ref 26–34)
MCHC RBC AUTO-ENTMCNC: 30.7 G/DL (ref 31–37)
MCV RBC AUTO: 97.5 FL
MONOCYTES # BLD AUTO: 0.43 X10(3) UL (ref 0.1–1)
MONOCYTES NFR BLD AUTO: 4.9 %
NEUTROPHILS # BLD AUTO: 4.47 X10 (3) UL (ref 1.5–7.7)
NEUTROPHILS # BLD AUTO: 4.47 X10(3) UL (ref 1.5–7.7)
NEUTROPHILS NFR BLD AUTO: 51.5 %
OSMOLALITY SERPL CALC.SUM OF ELEC: 285 MOSM/KG (ref 275–295)
P AXIS: 46 DEGREES
P-R INTERVAL: 144 MS
PLATELET # BLD AUTO: 337 10(3)UL (ref 150–450)
POTASSIUM SERPL-SCNC: 4.2 MMOL/L (ref 3.5–5.1)
PROT SERPL-MCNC: 7.8 G/DL (ref 6.4–8.2)
Q-T INTERVAL: 328 MS
QRS DURATION: 70 MS
QTC CALCULATION (BEZET): 447 MS
R AXIS: 49 DEGREES
RBC # BLD AUTO: 4.01 X10(6)UL
SODIUM SERPL-SCNC: 136 MMOL/L (ref 136–145)
T AXIS: 49 DEGREES
VENTRICULAR RATE: 112 BPM
WBC # BLD AUTO: 8.7 X10(3) UL (ref 4–11)

## 2023-07-24 PROCEDURE — 3044F HG A1C LEVEL LT 7.0%: CPT | Performed by: FAMILY MEDICINE

## 2023-07-24 PROCEDURE — 80053 COMPREHEN METABOLIC PANEL: CPT | Performed by: FAMILY MEDICINE

## 2023-07-24 PROCEDURE — 83036 HEMOGLOBIN GLYCOSYLATED A1C: CPT | Performed by: FAMILY MEDICINE

## 2023-07-24 PROCEDURE — 36415 COLL VENOUS BLD VENIPUNCTURE: CPT | Performed by: FAMILY MEDICINE

## 2023-07-24 PROCEDURE — 85025 COMPLETE CBC W/AUTO DIFF WBC: CPT | Performed by: FAMILY MEDICINE

## 2023-07-24 NOTE — PATIENT INSTRUCTIONS
Preoperative evaluation completed today. Preoperative electrocardiogram shows sinus tachycardia. Possible left atrial enlargement. Nonspecific T wave abnormality. Much of this was because she was moving during the time of the electrocardiogram.  Overall, the electrocardiogram is essentially normal.  She is having lab work done today. She is medically clear for surgery.

## 2023-08-07 ENCOUNTER — OFFICE VISIT (OUTPATIENT)
Dept: FAMILY MEDICINE CLINIC | Facility: CLINIC | Age: 38
End: 2023-08-07
Payer: MEDICAID

## 2023-08-07 ENCOUNTER — TELEPHONE (OUTPATIENT)
Dept: FAMILY MEDICINE CLINIC | Facility: CLINIC | Age: 38
End: 2023-08-07

## 2023-08-07 VITALS
TEMPERATURE: 97 F | BODY MASS INDEX: 39.87 KG/M2 | HEART RATE: 122 BPM | HEIGHT: 63 IN | SYSTOLIC BLOOD PRESSURE: 124 MMHG | DIASTOLIC BLOOD PRESSURE: 72 MMHG | RESPIRATION RATE: 18 BRPM | OXYGEN SATURATION: 100 % | WEIGHT: 225 LBS

## 2023-08-07 DIAGNOSIS — I10 ESSENTIAL HYPERTENSION: ICD-10-CM

## 2023-08-07 DIAGNOSIS — E11.65 CONTROLLED TYPE 2 DIABETES MELLITUS WITH HYPERGLYCEMIA, WITHOUT LONG-TERM CURRENT USE OF INSULIN (HCC): ICD-10-CM

## 2023-08-07 DIAGNOSIS — R47.01 NONVERBAL: ICD-10-CM

## 2023-08-07 DIAGNOSIS — F71 MODERATE INTELLECTUAL DISABILITY: ICD-10-CM

## 2023-08-07 DIAGNOSIS — L02.419 ABSCESS OF AXILLARY REGION: Primary | ICD-10-CM

## 2023-08-07 PROCEDURE — 99214 OFFICE O/P EST MOD 30 MIN: CPT | Performed by: FAMILY MEDICINE

## 2023-08-07 PROCEDURE — 3074F SYST BP LT 130 MM HG: CPT | Performed by: FAMILY MEDICINE

## 2023-08-07 PROCEDURE — 3078F DIAST BP <80 MM HG: CPT | Performed by: FAMILY MEDICINE

## 2023-08-07 PROCEDURE — 3008F BODY MASS INDEX DOCD: CPT | Performed by: FAMILY MEDICINE

## 2023-08-07 RX ORDER — AMOXICILLIN AND CLAVULANATE POTASSIUM 875; 125 MG/1; MG/1
1 TABLET, FILM COATED ORAL 2 TIMES DAILY
Qty: 20 TABLET | Refills: 0 | Status: SHIPPED | OUTPATIENT
Start: 2023-08-07 | End: 2023-08-17

## 2023-08-07 NOTE — PATIENT INSTRUCTIONS
Warm compress to right arm pit for 5-10 min at least 3 times a day- can be done upto every 2 hours as tolerates    Oral antibiotic    Local wound care if abscess opens     Monitor and recheck if needed

## 2023-08-07 NOTE — TELEPHONE ENCOUNTER
Appt scheduled    Future Appointments   Date Time Provider Renetta Rodriguezi   8/7/2023 11:30 Rasheed Yanes MD [facility]   8/24/2023  9:00 AM Zack Schwab MD [facility]

## 2023-08-07 NOTE — TELEPHONE ENCOUNTER
Received fax from [address] [place] [address] [employer], pt has a very large boil in right armpit.   See fax    Please advise

## 2023-08-25 ENCOUNTER — HOSPITAL ENCOUNTER (OUTPATIENT)
Facility: HOSPITAL | Age: 38
Setting detail: HOSPITAL OUTPATIENT SURGERY
Discharge: HOME | End: 2023-08-25
Attending: OTOLARYNGOLOGY | Admitting: OTOLARYNGOLOGY
Payer: MEDICAID

## 2023-08-25 ENCOUNTER — ANESTHESIA (OUTPATIENT)
Dept: SURGERY | Facility: HOSPITAL | Age: 38
End: 2023-08-25
Payer: MEDICAID

## 2023-08-25 ENCOUNTER — ANESTHESIA EVENT (OUTPATIENT)
Dept: SURGERY | Facility: HOSPITAL | Age: 38
End: 2023-08-25
Payer: MEDICAID

## 2023-08-25 VITALS
SYSTOLIC BLOOD PRESSURE: 124 MMHG | RESPIRATION RATE: 18 BRPM | WEIGHT: 229.5 LBS | BODY MASS INDEX: 40.66 KG/M2 | DIASTOLIC BLOOD PRESSURE: 75 MMHG | OXYGEN SATURATION: 97 % | HEIGHT: 63 IN | HEART RATE: 91 BPM | TEMPERATURE: 97 F

## 2023-08-25 LAB
B-HCG UR QL: NEGATIVE
GLUCOSE BLD-MCNC: 86 MG/DL (ref 70–99)

## 2023-08-25 PROCEDURE — 09C47ZZ EXTIRPATION OF MATTER FROM LEFT EXTERNAL AUDITORY CANAL, VIA NATURAL OR ARTIFICIAL OPENING: ICD-10-PCS | Performed by: OTOLARYNGOLOGY

## 2023-08-25 PROCEDURE — 69210 REMOVE IMPACTED EAR WAX UNI: CPT | Performed by: OTOLARYNGOLOGY

## 2023-08-25 PROCEDURE — 09C37ZZ EXTIRPATION OF MATTER FROM RIGHT EXTERNAL AUDITORY CANAL, VIA NATURAL OR ARTIFICIAL OPENING: ICD-10-PCS | Performed by: OTOLARYNGOLOGY

## 2023-08-25 RX ORDER — DEXTROSE MONOHYDRATE 25 G/50ML
50 INJECTION, SOLUTION INTRAVENOUS
Status: DISCONTINUED | OUTPATIENT
Start: 2023-08-25 | End: 2023-08-25 | Stop reason: HOSPADM

## 2023-08-25 RX ORDER — MIDAZOLAM HYDROCHLORIDE 10 MG/2ML
INJECTION, SOLUTION INTRAMUSCULAR; INTRAVENOUS
Status: COMPLETED
Start: 2023-08-25 | End: 2023-08-25

## 2023-08-25 RX ORDER — SODIUM CHLORIDE, SODIUM LACTATE, POTASSIUM CHLORIDE, CALCIUM CHLORIDE 600; 310; 30; 20 MG/100ML; MG/100ML; MG/100ML; MG/100ML
INJECTION, SOLUTION INTRAVENOUS CONTINUOUS
OUTPATIENT
Start: 2023-08-25

## 2023-08-25 RX ORDER — ONDANSETRON 2 MG/ML
4 INJECTION INTRAMUSCULAR; INTRAVENOUS EVERY 6 HOURS PRN
OUTPATIENT
Start: 2023-08-25

## 2023-08-25 RX ORDER — NALOXONE HYDROCHLORIDE 0.4 MG/ML
0.08 INJECTION, SOLUTION INTRAMUSCULAR; INTRAVENOUS; SUBCUTANEOUS AS NEEDED
OUTPATIENT
Start: 2023-08-25 | End: 2023-08-25

## 2023-08-25 RX ORDER — PROCHLORPERAZINE EDISYLATE 5 MG/ML
5 INJECTION INTRAMUSCULAR; INTRAVENOUS EVERY 8 HOURS PRN
OUTPATIENT
Start: 2023-08-25

## 2023-08-25 RX ORDER — KETAMINE HYDROCHLORIDE 50 MG/ML
INJECTION, SOLUTION, CONCENTRATE INTRAMUSCULAR; INTRAVENOUS AS NEEDED
Status: DISCONTINUED | OUTPATIENT
Start: 2023-08-25 | End: 2023-08-25 | Stop reason: SURG

## 2023-08-25 RX ORDER — MIDAZOLAM HYDROCHLORIDE 5 MG/ML
4 INJECTION INTRAMUSCULAR; INTRAVENOUS ONCE
Status: COMPLETED | OUTPATIENT
Start: 2023-08-25 | End: 2023-08-25

## 2023-08-25 RX ORDER — NICOTINE POLACRILEX 4 MG
15 LOZENGE BUCCAL
Status: DISCONTINUED | OUTPATIENT
Start: 2023-08-25 | End: 2023-08-25 | Stop reason: HOSPADM

## 2023-08-25 RX ORDER — SODIUM CHLORIDE, SODIUM LACTATE, POTASSIUM CHLORIDE, CALCIUM CHLORIDE 600; 310; 30; 20 MG/100ML; MG/100ML; MG/100ML; MG/100ML
INJECTION, SOLUTION INTRAVENOUS CONTINUOUS
Status: DISCONTINUED | OUTPATIENT
Start: 2023-08-25 | End: 2023-08-25

## 2023-08-25 RX ORDER — NICOTINE POLACRILEX 4 MG
30 LOZENGE BUCCAL
Status: DISCONTINUED | OUTPATIENT
Start: 2023-08-25 | End: 2023-08-25 | Stop reason: HOSPADM

## 2023-08-25 RX ORDER — ACETAMINOPHEN 500 MG
1000 TABLET ORAL ONCE
Status: DISCONTINUED | OUTPATIENT
Start: 2023-08-25 | End: 2023-08-25 | Stop reason: HOSPADM

## 2023-08-25 RX ADMIN — KETAMINE HYDROCHLORIDE 100 MG: 50 INJECTION, SOLUTION, CONCENTRATE INTRAMUSCULAR; INTRAVENOUS at 09:52:00

## 2023-08-25 NOTE — OPERATIVE REPORT
[facility]    PATIENT'S NAME: Marielle Dowell   ATTENDING PHYSICIAN: Crystal Calderón M.D. OPERATING PHYSICIAN: Crystal Calderón M.D. PATIENT ACCOUNT#:   [de-identified]    LOCATION:  [facility] 24 EDWP 10  MEDICAL RECORD #:   [id number]       YOB: 1985  ADMISSION DATE:       08/25/2023      OPERATION DATE:  08/25/2023    OPERATIVE REPORT      PREOPERATIVE DIAGNOSIS:  Bilateral cerumen impaction with conductive hearing loss. POSTOPERATIVE DIAGNOSIS:  Bilateral cerumen impaction with conductive hearing loss. PROCEDURE:  Removal of cerumen, bilateral, and exam of ear under monitored anesthesia. ANESTHESIA:  General.    OPERATIVE TECHNIQUE:  Patient was taken to the operating room, placed in supine position. After sufficient anesthesia by sedation, procedure was commenced. The microscope was brought in position. The left external auditory canal was completely obstructed with cerumen. This was suctioned free, use of curettes, as well as peroxide irrigations until no further wax was seen. Tympanic membrane was intact. A like procedure was carried out on the right ear canal.  That tympanic membrane also appeared intact. The patient then was awoken in the operating room and transferred to recovery room in stable condition. Estimated blood loss zero.      Dictated By Crystal Calderón M.D.  d: 08/25/2023 10:08:45  t: 08/25/2023 14:32:05  Job [id number]  /

## 2023-08-25 NOTE — BRIEF OP NOTE
Pre-Operative Diagnosis: Bilateral impacted cerumen [H61.23]     Post-Operative Diagnosis: * No post-op diagnosis entered *      Procedure Performed:   Removal of Bilateral Cerumen and Examination under Intravenous Sedation.  bilateral    Surgeon(s) and Role:     Ariana Gonzales MD - Primary    Assistant(s):        Surgical Findings: both canals completely obstructed     Specimen: none     Estimated Blood Loss: No data recorded    Dictation Number:  done    Shital Bolanos MD  8/25/2023  10:06 AM

## 2023-08-25 NOTE — ANESTHESIA POSTPROCEDURE EVALUATION
1 JUANITA Fernández Patient Status:  Hospital Outpatient Surgery   Age/Gender 43year old female MRN [id number]   Location [address] [place] [address] Attending Cruz Contreras MD   Hosp Day # 0 PCP Anatoly España MD       Anesthesia Post-op Note    Removal of Bilateral Cerumen and Examination under Intravenous Sedation. bilateral    Procedure Summary       Date: 08/25/23 Room / Location: [address] [place] [address] OR 02 / 1404 [facility] OR    Anesthesia Start: 6977 Anesthesia Stop: 0336    Procedure: Removal of Bilateral Cerumen and Examination under Intravenous Sedation. bilateral (Bilateral) Diagnosis:       Bilateral impacted cerumen      (Bilateral impacted cerumen [D43.06])    Surgeons: Cruz Contreras MD Anesthesiologist: Manjeet Esteves MD    Anesthesia Type: general ASA Status: 2            Anesthesia Type: general    Vitals Value Taken Time   /72 08/25/23 1016   Temp 97.5 08/25/23 1016   Pulse 72 08/25/23 1016   Resp 16 08/25/23 1016   SpO2 100 08/25/23 1016       Patient Location: Same Day Surgery    Anesthesia Type: general    Airway Patency: patent    Postop Pain Control: adequate    Mental Status: mildly sedated but able to meaningfully participate in the post-anesthesia evaluation    Nausea/Vomiting: none    Cardiopulmonary/Hydration status: stable euvolemic    Complications: no apparent anesthesia related complications    Postop vital signs: stable    Dental Exam: Unchanged from Preop    Patient to be discharged from PACU when criteria met.

## 2023-08-25 NOTE — DISCHARGE INSTRUCTIONS
Instructions per Dr Sharyle Aus have been given medication that makes you sleepy. This may have included both pain medication and sleeping medication. Most of the effects have worn off but you may still have some drowsiness for the next 6 to 8 hours. Home Care  Follow these guidelines when you get home:    --Don't drink any alcohol for the next 24 hours. --Don't drive, operate dangerous machinery, make important business or personal    decisions, or sign legal documents during the next 24 hours.     Follow Up Care  Follow up with your healthcare provider if you are not alert and back to your usual level of activity within 12 hours    When to seek medical advice  Call your healthcare provider right away if any of these occur:    --Drowsiness that gets worse  --Weakness or dizziness that gets worse  --Repeated vomiting  --You can not be awakened

## 2023-08-25 NOTE — PROGRESS NOTES
Pt nonverbal per norm, pt moaning attempting to leave, child life specialist with pt, legal guardian, and nurse with pt. Pt sitting in hallway.

## 2023-08-28 ENCOUNTER — TELEPHONE (OUTPATIENT)
Facility: LOCATION | Age: 38
End: 2023-08-28

## 2023-08-28 NOTE — TELEPHONE ENCOUNTER
Received a call from [facility] stating pt was to received amoxicillin and pharmacy has not received it. Pharmacy to send to will be [facility]) - [address] [facility].  [phone], [phone] [[id number]]

## 2023-09-18 ENCOUNTER — OFFICE VISIT (OUTPATIENT)
Facility: LOCATION | Age: 38
End: 2023-09-18
Payer: MEDICAID

## 2023-09-18 DIAGNOSIS — H91.93 AUDITORY IMPAIRMENT, BILATERAL: Primary | ICD-10-CM

## 2023-09-18 PROCEDURE — 99213 OFFICE O/P EST LOW 20 MIN: CPT | Performed by: OTOLARYNGOLOGY

## 2023-09-21 ENCOUNTER — OFFICE VISIT (OUTPATIENT)
Dept: FAMILY MEDICINE CLINIC | Facility: CLINIC | Age: 38
End: 2023-09-21
Payer: MEDICAID

## 2023-09-21 ENCOUNTER — LAB ENCOUNTER (OUTPATIENT)
Dept: LAB | Age: 38
End: 2023-09-21
Attending: FAMILY MEDICINE
Payer: MEDICAID

## 2023-09-21 VITALS
SYSTOLIC BLOOD PRESSURE: 122 MMHG | BODY MASS INDEX: 40.4 KG/M2 | HEIGHT: 63 IN | WEIGHT: 228 LBS | TEMPERATURE: 98 F | OXYGEN SATURATION: 97 % | HEART RATE: 108 BPM | DIASTOLIC BLOOD PRESSURE: 68 MMHG

## 2023-09-21 DIAGNOSIS — I10 ESSENTIAL HYPERTENSION: ICD-10-CM

## 2023-09-21 DIAGNOSIS — Z00.00 PHYSICAL EXAM: Primary | ICD-10-CM

## 2023-09-21 DIAGNOSIS — E11.65 CONTROLLED TYPE 2 DIABETES MELLITUS WITH HYPERGLYCEMIA, WITHOUT LONG-TERM CURRENT USE OF INSULIN (HCC): ICD-10-CM

## 2023-09-21 DIAGNOSIS — Z00.00 PHYSICAL EXAM: ICD-10-CM

## 2023-09-21 DIAGNOSIS — E78.1 PURE HYPERTRIGLYCERIDEMIA: ICD-10-CM

## 2023-09-21 PROBLEM — Z01.818 PREOP EXAMINATION: Status: RESOLVED | Noted: 2023-07-11 | Resolved: 2023-09-21

## 2023-09-21 PROBLEM — H61.23 BILATERAL IMPACTED CERUMEN: Status: RESOLVED | Noted: 2023-07-11 | Resolved: 2023-09-21

## 2023-09-21 PROBLEM — U07.1 PNEUMONIA DUE TO COVID-19 VIRUS: Status: RESOLVED | Noted: 2020-12-23 | Resolved: 2023-09-21

## 2023-09-21 PROBLEM — J12.82 PNEUMONIA DUE TO COVID-19 VIRUS: Status: RESOLVED | Noted: 2020-12-23 | Resolved: 2023-09-21

## 2023-09-21 PROBLEM — D64.9 NORMOCYTIC ANEMIA: Status: RESOLVED | Noted: 2020-03-24 | Resolved: 2023-09-21

## 2023-09-21 LAB
ALBUMIN SERPL-MCNC: 4 G/DL (ref 3.4–5)
ALBUMIN/GLOB SERPL: 0.9 {RATIO} (ref 1–2)
ALP LIVER SERPL-CCNC: 41 U/L
ALT SERPL-CCNC: 21 U/L
ANION GAP SERPL CALC-SCNC: 9 MMOL/L (ref 0–18)
AST SERPL-CCNC: 17 U/L (ref 15–37)
BASOPHILS # BLD AUTO: 0.02 X10(3) UL (ref 0–0.2)
BASOPHILS NFR BLD AUTO: 0.3 %
BILIRUB SERPL-MCNC: 0.3 MG/DL (ref 0.1–2)
BILIRUB UR QL STRIP.AUTO: NEGATIVE
BUN BLD-MCNC: 15 MG/DL (ref 7–18)
CALCIUM BLD-MCNC: 10.1 MG/DL (ref 8.5–10.1)
CHLORIDE SERPL-SCNC: 109 MMOL/L (ref 98–112)
CHOLEST SERPL-MCNC: 105 MG/DL (ref ?–200)
CLARITY UR REFRACT.AUTO: CLEAR
CO2 SERPL-SCNC: 20 MMOL/L (ref 21–32)
CREAT BLD-MCNC: 1.2 MG/DL
CREAT UR-SCNC: 64.5 MG/DL
EGFRCR SERPLBLD CKD-EPI 2021: 59 ML/MIN/1.73M2 (ref 60–?)
EOSINOPHIL # BLD AUTO: 0.14 X10(3) UL (ref 0–0.7)
EOSINOPHIL NFR BLD AUTO: 1.8 %
ERYTHROCYTE [DISTWIDTH] IN BLOOD BY AUTOMATED COUNT: 14.4 %
EST. AVERAGE GLUCOSE BLD GHB EST-MCNC: 100 MG/DL (ref 68–126)
GLOBULIN PLAS-MCNC: 4.3 G/DL (ref 2.8–4.4)
GLUCOSE BLD-MCNC: 57 MG/DL (ref 70–99)
GLUCOSE UR STRIP.AUTO-MCNC: >1000 MG/DL
HBA1C MFR BLD: 5.1 % (ref ?–5.7)
HCT VFR BLD AUTO: 39 %
HDLC SERPL-MCNC: 27 MG/DL (ref 40–59)
HGB BLD-MCNC: 12.1 G/DL
IMM GRANULOCYTES # BLD AUTO: 0.03 X10(3) UL (ref 0–1)
IMM GRANULOCYTES NFR BLD: 0.4 %
KETONES UR STRIP.AUTO-MCNC: NEGATIVE MG/DL
LDLC SERPL CALC-MCNC: 59 MG/DL (ref ?–100)
LEUKOCYTE ESTERASE UR QL STRIP.AUTO: 75
LYMPHOCYTES # BLD AUTO: 2.71 X10(3) UL (ref 1–4)
LYMPHOCYTES NFR BLD AUTO: 35.7 %
MCH RBC QN AUTO: 29.6 PG (ref 26–34)
MCHC RBC AUTO-ENTMCNC: 31 G/DL (ref 31–37)
MCV RBC AUTO: 95.4 FL
MICROALBUMIN UR-MCNC: <0.5 MG/DL
MONOCYTES # BLD AUTO: 0.5 X10(3) UL (ref 0.1–1)
MONOCYTES NFR BLD AUTO: 6.6 %
NEUTROPHILS # BLD AUTO: 4.2 X10 (3) UL (ref 1.5–7.7)
NEUTROPHILS # BLD AUTO: 4.2 X10(3) UL (ref 1.5–7.7)
NEUTROPHILS NFR BLD AUTO: 55.2 %
NITRITE UR QL STRIP.AUTO: NEGATIVE
NONHDLC SERPL-MCNC: 78 MG/DL (ref ?–130)
OSMOLALITY SERPL CALC.SUM OF ELEC: 285 MOSM/KG (ref 275–295)
PH UR STRIP.AUTO: 7 [PH] (ref 5–8)
PLATELET # BLD AUTO: 351 10(3)UL (ref 150–450)
POTASSIUM SERPL-SCNC: 4.3 MMOL/L (ref 3.5–5.1)
PROT SERPL-MCNC: 8.3 G/DL (ref 6.4–8.2)
PROT UR STRIP.AUTO-MCNC: NEGATIVE MG/DL
RBC # BLD AUTO: 4.09 X10(6)UL
RBC UR QL AUTO: NEGATIVE
SODIUM SERPL-SCNC: 138 MMOL/L (ref 136–145)
SP GR UR STRIP.AUTO: 1.01 (ref 1–1.03)
TRIGL SERPL-MCNC: 96 MG/DL (ref 30–149)
TSI SER-ACNC: 1.18 MIU/ML (ref 0.36–3.74)
UROBILINOGEN UR STRIP.AUTO-MCNC: NORMAL MG/DL
VIT B12 SERPL-MCNC: 367 PG/ML (ref 193–986)
VLDLC SERPL CALC-MCNC: 14 MG/DL (ref 0–30)
WBC # BLD AUTO: 7.6 X10(3) UL (ref 4–11)

## 2023-09-21 PROCEDURE — 3008F BODY MASS INDEX DOCD: CPT | Performed by: FAMILY MEDICINE

## 2023-09-21 PROCEDURE — 36415 COLL VENOUS BLD VENIPUNCTURE: CPT

## 2023-09-21 PROCEDURE — 3074F SYST BP LT 130 MM HG: CPT | Performed by: FAMILY MEDICINE

## 2023-09-21 PROCEDURE — 80053 COMPREHEN METABOLIC PANEL: CPT

## 2023-09-21 PROCEDURE — 83036 HEMOGLOBIN GLYCOSYLATED A1C: CPT

## 2023-09-21 PROCEDURE — 80061 LIPID PANEL: CPT

## 2023-09-21 PROCEDURE — 82570 ASSAY OF URINE CREATININE: CPT

## 2023-09-21 PROCEDURE — 99395 PREV VISIT EST AGE 18-39: CPT | Performed by: FAMILY MEDICINE

## 2023-09-21 PROCEDURE — 85025 COMPLETE CBC W/AUTO DIFF WBC: CPT

## 2023-09-21 PROCEDURE — 3078F DIAST BP <80 MM HG: CPT | Performed by: FAMILY MEDICINE

## 2023-09-21 PROCEDURE — 82043 UR ALBUMIN QUANTITATIVE: CPT

## 2023-09-21 PROCEDURE — 82607 VITAMIN B-12: CPT

## 2023-09-21 PROCEDURE — 84443 ASSAY THYROID STIM HORMONE: CPT

## 2023-09-21 PROCEDURE — 81001 URINALYSIS AUTO W/SCOPE: CPT

## 2023-09-21 NOTE — PATIENT INSTRUCTIONS
Continue current medications. Continue with current care. Check labs. Continue with low-carb diet. Continue to monitor glucose level, call if glucose level less than 70 or more than 300. Return to clinic if any concerns.

## 2023-09-22 ENCOUNTER — TELEPHONE (OUTPATIENT)
Dept: FAMILY MEDICINE CLINIC | Facility: CLINIC | Age: 38
End: 2023-09-22

## 2023-09-22 RX ORDER — GLIPIZIDE 5 MG/1
5 TABLET, FILM COATED, EXTENDED RELEASE ORAL DAILY
COMMUNITY

## 2023-09-22 NOTE — TELEPHONE ENCOUNTER
Lab results sent via fax to [address] [place] [address],    Spoke to [facility] verbalized understanding of lab results, medication change      No further questions . Med list updated.

## (undated) DIAGNOSIS — Z77.21 EXPOSURE TO BLOOD OR BODY FLUID: Primary | ICD-10-CM

## (undated) DEVICE — STERILE SYNTHETIC POLYISOPRENE POWDER-FREE SURGICAL GLOVES WITH HYDROGEL COATING: Brand: PROTEXIS

## (undated) DEVICE — MYRINGOTOMY PACK-LF: Brand: MEDLINE INDUSTRIES, INC.

## (undated) NOTE — MR AVS SNAPSHOT
[address],  [address]  [phone]               Thank you for choosing us for your health care visit with Ray Wright MD.  We are glad to serve you and happy to provide you with this summary of yo allopurinol 100 MG Tabs   Take 1 tablet by mouth daily. Commonly known as:  Cathshilpin Silversmith [phone] MG/5ML Susp   Generic drug:  Alum & Mag Hydroxide-Simeth   Take 30 mL by mouth.  Every 12 hours as needed for indigestion           A LORazepam 0.5 MG Tabs   Take 1 tablet by mouth 2 (two) times daily.    Commonly known as:  ATIVAN           menthol-methyl salicylate 85-24 % Crea   Apply to left leg every 6 hours as needed for pain           metRONIDAZOLE 0.75 % Crea   Apply to face at b Fully enjoy your food when eating. Don’t eat while distracted and slow down. Avoid over sized portions. Don’t eat while when you’re bored.      EAT THESE FOODS MORE OFTEN: EAT THESE FOODS LESS OFTEN:   Make half your plate fruits and vegetables Highly

## (undated) NOTE — LETTER
06/28/19        Larissa Hargrove  [address] [facility]      Dear Cristina Barreto,    Our records indicate that you have outstanding lab work and or testing that was ordered for you and has not yet been completed:  Orders Placed This Encounter      CBC Wi

## (undated) NOTE — LETTER
Date: 1/6/2023    Patient Name: Tommy Flores          To Whom it may concern:    Tommy Flores has no communicable disease or illness.          Sincerely,    Antonio Prajapati MD

## (undated) NOTE — LETTER
10/19/17        [address] [place] [address]      Dear Keven Han,    Our records indicate that you have outstanding lab work and or testing that was ordered for you and has not yet been completed:          Hemoglobin A1C [E]      Comp Met

## (undated) NOTE — LETTER
Patient Name: Mariama Almonte / Sex: 2/24/1985-A: 43 y  female   Medical Records: [id number] CSN: [id number]    Office of State Guardian:    Please have the authorized state guardian sign and date the attached surgical, anesthesia consents and authorization and agreements forms. Return these signed consents/forms along with the completed and signed consent to the [address] [place] [address] as soon as possible.   Thank you.      [phone] - Fax

## (undated) NOTE — LETTER
OUTSIDE TESTING RESULT REQUEST     IMPORTANT: FOR YOUR IMMEDIATE ATTENTION  Please FAX all test results listed below to: [phone]         * * * * If testing is NOT complete, arrange with patient A.S.A.P. * * * *      Patient Name: Samm Rao  Surgery Date: 2023  Medical Record: [id number]  CSN: [id number]  : 1985 - A: 43 y     Sex: female  Surgeon(s):  Anat Weaver MD  Procedure: Removal of Cerumen Removal and Exam under IV Sedation MAC  Anesthesia Type: MAC     Surgeon: Anat Weaver MD     The following Testing and Time Line are REQUIRED PER ANESTHESIA     EKG READ AND SIGNED WITHIN   90 days  BMP (requires 4 hour fast) within  90 days      Thank You,   Sent Asad Curry RN